# Patient Record
Sex: FEMALE | Race: BLACK OR AFRICAN AMERICAN | NOT HISPANIC OR LATINO | Employment: UNEMPLOYED | URBAN - METROPOLITAN AREA
[De-identification: names, ages, dates, MRNs, and addresses within clinical notes are randomized per-mention and may not be internally consistent; named-entity substitution may affect disease eponyms.]

---

## 2017-01-06 ENCOUNTER — ALLIED HEALTH/NURSE VISIT (OUTPATIENT)
Dept: NURSING | Facility: CLINIC | Age: 42
End: 2017-01-06
Payer: COMMERCIAL

## 2017-01-06 PROCEDURE — 96372 THER/PROPH/DIAG INJ SC/IM: CPT

## 2017-01-06 PROCEDURE — 99207 ZZC NO CHARGE NURSE ONLY: CPT

## 2017-01-06 NOTE — PROGRESS NOTES
MEDICATION: Depo Provera 150mg  ROUTE: IM  SITE: Deltoid - Right  DOSE: 1 mL  LOT #: [id number]  :  SNAPCARD   EXPIRATION DATE:  03/2019  NDC#: [address]-2    Martina Archuleta CMA  Follow Up Injection    Patient returning during stated date range given at previous visit: Yes      If here at the correct interval:   BP Readings from Last 1 Encounters:   12/22/16 168/110     Wt Readings from Last 1 Encounters:   12/22/16 112 lb 11.2 oz (51.12 kg)         Side effects or problems with last injection?  No.  Date range is given to patient for next dose: 03/24-04/06/2017    See Medication Note for administration information    Staff Sig: Martina Archuleta CMA

## 2017-01-19 DIAGNOSIS — E87.6 HYPOKALEMIA: ICD-10-CM

## 2017-01-19 DIAGNOSIS — L50.9 HIVES: ICD-10-CM

## 2017-01-19 DIAGNOSIS — I10 BENIGN ESSENTIAL HYPERTENSION: Primary | ICD-10-CM

## 2017-01-19 NOTE — TELEPHONE ENCOUNTER
Reason for Call:  Medication refill:    Do you use a [facility]?  Name of the pharmacy and phone number for the current request: [facility] [address]    Name of the medication requested: metoprolol (LOPRESSOR) 50 MG tablet, loratadine (CLARITIN) 10 MG tablet, & amLODIPine (NORVASC) 10 MG tablet    Other request: Patient is requesting a 3 month supply of all 3 Rx's & she is completely out of the amLODIPine (NORVASC) 10 MG tablet    Can we leave a detailed message on this number? YES    Phone number patient can be reached at: Cell number on file:    Telephone Information:   Mobile [phone]       Best Time: Anytime    Call taken on 1/19/2017 at 1:05 PM by Nik Vallejo

## 2017-01-20 RX ORDER — AMLODIPINE BESYLATE 10 MG/1
10 TABLET ORAL DAILY
Qty: 90 TABLET | Refills: 0 | Status: SHIPPED | OUTPATIENT
Start: 2017-01-20 | End: 2017-02-06

## 2017-01-20 RX ORDER — METOPROLOL TARTRATE 50 MG
50 TABLET ORAL 2 TIMES DAILY
Qty: 180 TABLET | Refills: 0 | Status: SHIPPED | OUTPATIENT
Start: 2017-01-20 | End: 2017-02-06

## 2017-01-20 RX ORDER — LORATADINE 10 MG/1
10 TABLET ORAL DAILY
Qty: 90 TABLET | Refills: 2 | Status: SHIPPED | OUTPATIENT
Start: 2017-01-20 | End: 2017-02-06

## 2017-01-20 NOTE — TELEPHONE ENCOUNTER
amLODIPine (NORVASC) 10 MG tablet & metoprolol  Last Written Prescription Date: 09/06/2015  Last Fill Quantity: 90 & 180, # refills: 3  Last Office Visit with [facility], [facility] or [facility] prescribing provider: 09/28/2016       POTASSIUM   Date Value Ref Range Status   09/28/2016 3.0* 3.4 - 5.3 mmol/L Final     CREATININE   Date Value Ref Range Status   09/28/2016 1.05* 0.52 - 1.04 mg/dL Final     BP Readings from Last 3 Encounters:   12/22/16 168/110   10/27/16 120/88   09/28/16 152/86     Routing refill request to provider for review/approval because:  Blood pressure above goal. Asking for 90 days supply.      loratadine (CLARITIN) 10 MG tablet  Last Written Prescription Date: 11/10/2016  Last Fill Quantity: 90,  # refills: 3   Last Office Visit with [facility], [facility] or [facility] prescribing provider: 09/28/2016     Prescription approved per [facility] Refill Protocol.

## 2017-01-20 NOTE — TELEPHONE ENCOUNTER
1. Due for an ov  Re the BP in March so given #90 of both     2. Please get the K+ done asap as was low last time    Apparently on K+ supplements still?

## 2017-01-31 DIAGNOSIS — E87.6 HYPOKALEMIA: ICD-10-CM

## 2017-01-31 DIAGNOSIS — R73.9 HYPERGLYCEMIA: ICD-10-CM

## 2017-01-31 LAB
ANION GAP SERPL CALCULATED.3IONS-SCNC: 7 MMOL/L (ref 3–14)
BUN SERPL-MCNC: 10 MG/DL (ref 7–30)
CALCIUM SERPL-MCNC: 9.1 MG/DL (ref 8.5–10.1)
CHLORIDE SERPL-SCNC: 108 MMOL/L (ref 94–109)
CO2 SERPL-SCNC: 25 MMOL/L (ref 20–32)
CREAT SERPL-MCNC: 0.84 MG/DL (ref 0.52–1.04)
GFR SERPL CREATININE-BSD FRML MDRD: 74 ML/MIN/1.7M2
GLUCOSE SERPL-MCNC: 98 MG/DL (ref 70–99)
HBA1C MFR BLD: 5.3 % (ref 4.3–6)
POTASSIUM SERPL-SCNC: 3.9 MMOL/L (ref 3.4–5.3)
SODIUM SERPL-SCNC: 140 MMOL/L (ref 133–144)

## 2017-01-31 PROCEDURE — 36415 COLL VENOUS BLD VENIPUNCTURE: CPT | Performed by: FAMILY MEDICINE

## 2017-01-31 PROCEDURE — 83036 HEMOGLOBIN GLYCOSYLATED A1C: CPT | Performed by: FAMILY MEDICINE

## 2017-01-31 PROCEDURE — 80048 BASIC METABOLIC PNL TOTAL CA: CPT | Performed by: FAMILY MEDICINE

## 2017-01-31 NOTE — LETTER
[facility]  [address]  [address]  [place] [facility] [place] [address]  [phone]                                                                                                           Celine Judd  [address]     [place] [address]    May 30, 2017      Dear Celine,    The results of your recent tests were reviewed and are enclosed.     Last hemoglobin a1c was very good. Please get another one in next month or so.    Results for orders placed or performed in visit on 01/31/17   Basic metabolic panel   Result Value Ref Range    Sodium 140 133 - 144 mmol/L    Potassium 3.9 3.4 - 5.3 mmol/L    Chloride 108 94 - 109 mmol/L    Carbon Dioxide 25 20 - 32 mmol/L    Anion Gap 7 3 - 14 mmol/L    Glucose 98 70 - 99 mg/dL    Urea Nitrogen 10 7 - 30 mg/dL    Creatinine 0.84 0.52 - 1.04 mg/dL    GFR Estimate 74 >60 mL/min/1.7m2    GFR Estimate If Black 89 >60 mL/min/1.7m2    Calcium 9.1 8.5 - 10.1 mg/dL   Hemoglobin A1c   Result Value Ref Range    Hemoglobin A1C 5.3 4.3 - 6.0 %     Thank you for choosing [facility].  We appreciate the opportunity to serve you and look forward to supporting your healthcare needs in the future.    If you have any questions or concerns, please call me or my staff at [phone].      Sincerely,    Willian Green MD

## 2017-02-06 ENCOUNTER — TELEPHONE (OUTPATIENT)
Dept: FAMILY MEDICINE | Facility: CLINIC | Age: 42
End: 2017-02-06

## 2017-02-06 DIAGNOSIS — I10 BENIGN ESSENTIAL HYPERTENSION: Primary | ICD-10-CM

## 2017-02-06 DIAGNOSIS — E87.6 HYPOKALEMIA: ICD-10-CM

## 2017-02-06 RX ORDER — METOPROLOL TARTRATE 50 MG
50 TABLET ORAL 2 TIMES DAILY
Qty: 180 TABLET | Refills: 0 | Status: SHIPPED | OUTPATIENT
Start: 2017-02-06 | End: 2017-11-09 | Stop reason: DRUGHIGH

## 2017-02-06 RX ORDER — AMLODIPINE BESYLATE 10 MG/1
10 TABLET ORAL DAILY
Qty: 90 TABLET | Refills: 0 | Status: SHIPPED | OUTPATIENT
Start: 2017-02-06 | End: 2018-09-06

## 2017-02-06 RX ORDER — LORATADINE 10 MG/1
10 TABLET ORAL DAILY
Qty: 90 TABLET | Refills: 2 | Status: SHIPPED | OUTPATIENT
Start: 2017-02-06 | End: 2017-06-23

## 2017-02-06 RX ORDER — AMLODIPINE BESYLATE 10 MG/1
10 TABLET ORAL DAILY
Qty: 90 TABLET | Refills: 0 | OUTPATIENT
Start: 2017-02-06

## 2017-02-06 RX ORDER — LORATADINE 10 MG/1
10 TABLET ORAL DAILY
Qty: 90 TABLET | Refills: 3 | OUTPATIENT
Start: 2017-02-06

## 2017-02-06 RX ORDER — METOPROLOL TARTRATE 50 MG
50 TABLET ORAL 2 TIMES DAILY
Qty: 180 TABLET | Refills: 0 | OUTPATIENT
Start: 2017-02-06

## 2017-02-06 NOTE — TELEPHONE ENCOUNTER
Patient calling to note that the Rx's below were supposed to be sent to [facility] [place], [place] - [address] [place] [address]  Please cancel Rx's at the [id number] - [place], [place] - [address] & send to the correct preferred pharmacy

## 2017-02-06 NOTE — TELEPHONE ENCOUNTER
Patient had Rx's approved by provider for Norvasc,amlodipine and Claritin. She will like Rx's sent to [facility]. Jim approved Rx's to [facility]. pt was informed.

## 2017-02-15 ENCOUNTER — RADIANT APPOINTMENT (OUTPATIENT)
Dept: MAMMOGRAPHY | Facility: CLINIC | Age: 42
End: 2017-02-15
Payer: COMMERCIAL

## 2017-02-15 DIAGNOSIS — Z12.31 VISIT FOR SCREENING MAMMOGRAM: ICD-10-CM

## 2017-02-15 PROCEDURE — G0202 SCR MAMMO BI INCL CAD: HCPCS | Mod: TC

## 2017-03-28 ENCOUNTER — ALLIED HEALTH/NURSE VISIT (OUTPATIENT)
Dept: NURSING | Facility: CLINIC | Age: 42
End: 2017-03-28
Payer: COMMERCIAL

## 2017-03-28 PROCEDURE — 96372 THER/PROPH/DIAG INJ SC/IM: CPT

## 2017-03-28 PROCEDURE — 99207 ZZC NO CHARGE NURSE ONLY: CPT

## 2017-03-28 RX ORDER — MEDROXYPROGESTERONE ACETATE 150 MG/ML
150 INJECTION, SUSPENSION INTRAMUSCULAR
Qty: 1 ML | Refills: 0 | OUTPATIENT
Start: 2017-03-28 | End: 2022-08-01

## 2017-03-28 NOTE — MR AVS SNAPSHOT
"              After Visit Summary   3/28/2017    Celine Judd    MRN: [id number]           Patient Information     Date Of Birth          1975        Visit Information        Provider Department      3/28/2017 2:30 PM BX NURSE [facility]        Today's Diagnoses     Contraception    -  1       Follow-ups after your visit        Who to contact     If you have questions or need follow up information about today's clinic visit or your schedule please contact [facility] directly at [phone].  Normal or non-critical lab and imaging results will be communicated to you by [employer], letter or phone within 4 business days after the clinic has received the results. If you do not hear from us within 7 days, please contact the clinic through [employer] or phone. If you have a critical or abnormal lab result, we will notify you by phone as soon as possible.  Submit refill requests through [employer] or call your pharmacy and they will forward the refill request to us. Please allow 3 business days for your refill to be completed.          Additional Information About Your Visit        MyChart Information     [employer] lets you send messages to your doctor, view your test results, renew your prescriptions, schedule appointments and more. To sign up, go to [email or web address] . Click on \"Log in\" on the left side of the screen, which will take you to the Welcome page. Then click on \"Sign up Now\" on the right side of the page.     You will be asked to enter the access code listed below, as well as some personal information. Please follow the directions to create your username and password.     Your access code is: [email or web address]  Expires: 2017  4:19 PM     Your access code will  in 90 days. If you need help or a new code, please call your [facility] or [phone].        Care EveryWhere ID     This is your Care EveryWhere ID. This could be used by " other organizations to access your [facility] medical records  [id number]         Blood Pressure from Last 3 Encounters:   12/22/16 (!) 168/110   10/27/16 120/88   09/28/16 152/86    Weight from Last 3 Encounters:   12/22/16 112 lb 11.2 oz (51.1 kg)   10/27/16 107 lb 12.8 oz (48.9 kg)   09/28/16 109 lb (49.4 kg)              Today, you had the following     No orders found for display         Today's Medication Changes          These changes are accurate as of: 3/28/17  4:19 PM.  If you have any questions, ask your nurse or doctor.               These medicines have changed or have updated prescriptions.        Dose/Directions    * medroxyPROGESTERone 150 MG/ML injection   Commonly known as:  DEPO-PROVERA   This may have changed:  Another medication with the same name was added. Make sure you understand how and when to take each.   Used for:  Contraception        Dose:  150 mg   Inject 1 mL (150 mg) into the muscle every 3 months   Quantity:  0.9 mL   Refills:  3       * medroxyPROGESTERone 150 MG/ML injection   Commonly known as:  DEPO-PROVERA   This may have changed:  You were already taking a medication with the same name, and this prescription was added. Make sure you understand how and when to take each.   Used for:  Contraception        Dose:  150 mg   Inject 1 mL (150 mg) into the muscle every 3 months   Quantity:  1 mL   Refills:  0       * Notice:  This list has 2 medication(s) that are the same as other medications prescribed for you. Read the directions carefully, and ask your doctor or other care provider to review them with you.         Where to get your medicines      Some of these will need a paper prescription and others can be bought over the counter.  Ask your nurse if you have questions.     You don't need a prescription for these medications     medroxyPROGESTERone 150 MG/ML injection                Primary Care Provider Office Phone # Fax #    Latonia Nguyen [phone]  [phone]       [facility] [address]  [facility] [place] [facility] [address]        Thank you!     Thank you for choosing [facility]  for your care. Our goal is always to provide you with excellent care. Hearing back from our patients is one way we can continue to improve our services. Please take a few minutes to complete the written survey that you may receive in the mail after your visit with us. Thank you!             Your Updated Medication List - Protect others around you: Learn how to safely use, store and throw away your medicines at www.disposemymeds.org.          This list is accurate as of: 3/28/17  4:19 PM.  Always use your most recent med list.                   Brand Name Dispense Instructions for use    acetaminophen 500 MG tablet    TYLENOL    100 tablet    Take 2 tablets po q 6 hr prn pain       amLODIPine 10 MG tablet    NORVASC    90 tablet    Take 1 tablet (10 mg) by mouth daily       BENADRYL PO          cetirizine 5 MG Chew    zyrTEC     Take 5 mg by mouth daily       cholecalciferol 5000 UNITS Caps capsule    vitamin D3    90 capsule    Take 1 capsule (5,000 Units) by mouth daily       escitalopram 10 MG tablet    LEXAPRO    30 tablet    Take 1 tablet (10 mg) by mouth daily       loratadine 10 MG tablet    CLARITIN    90 tablet    Take 1 tablet (10 mg) by mouth daily       * medroxyPROGESTERone 150 MG/ML injection    DEPO-PROVERA    0.9 mL    Inject 1 mL (150 mg) into the muscle every 3 months       * medroxyPROGESTERone 150 MG/ML injection    DEPO-PROVERA    1 mL    Inject 1 mL (150 mg) into the muscle every 3 months       metoprolol 50 MG tablet    LOPRESSOR    180 tablet    Take 1 tablet (50 mg) by mouth 2 times daily       naproxen 500 MG tablet    NAPROSYN    60 tablet    Take 1 tablet (500 mg) by mouth 2 times daily (with meals)       potassium chloride SA 10 MEQ CR tablet    K-DUR/KLOR-CON M    20 tablet    Take 1 tablet (10 mEq) by mouth 2 times  daily       ranitidine 150 MG tablet    ZANTAC    180 tablet    Take 1 tablet (150 mg) by mouth 2 times daily       * Notice:  This list has 2 medication(s) that are the same as other medications prescribed for you. Read the directions carefully, and ask your doctor or other care provider to review them with you.

## 2017-03-28 NOTE — NURSING NOTE
Prior to injection verified patient identity using patient's name and date of birth.  Karla Brooks LPN

## 2017-05-29 NOTE — PROGRESS NOTES
Last hemoglobin a1c was very good. Please get another one in next month or so.     Please let patient know by calling or sending a letter.

## 2017-06-23 ENCOUNTER — OFFICE VISIT (OUTPATIENT)
Dept: FAMILY MEDICINE | Facility: CLINIC | Age: 42
End: 2017-06-23
Payer: COMMERCIAL

## 2017-06-23 VITALS
WEIGHT: 114 LBS | HEIGHT: 65 IN | DIASTOLIC BLOOD PRESSURE: 68 MMHG | SYSTOLIC BLOOD PRESSURE: 124 MMHG | HEART RATE: 95 BPM | TEMPERATURE: 97.8 F | RESPIRATION RATE: 20 BRPM | OXYGEN SATURATION: 98 % | BODY MASS INDEX: 18.99 KG/M2

## 2017-06-23 DIAGNOSIS — F32.5 MAJOR DEPRESSION IN COMPLETE REMISSION (H): ICD-10-CM

## 2017-06-23 DIAGNOSIS — Z30.8 ENCOUNTER FOR OTHER CONTRACEPTIVE MANAGEMENT: ICD-10-CM

## 2017-06-23 DIAGNOSIS — E87.6 HYPOKALEMIA: ICD-10-CM

## 2017-06-23 DIAGNOSIS — K21.9 GASTROESOPHAGEAL REFLUX DISEASE WITHOUT ESOPHAGITIS: ICD-10-CM

## 2017-06-23 DIAGNOSIS — J30.9 ALLERGIC RHINITIS, UNSPECIFIED ALLERGIC RHINITIS TRIGGER, UNSPECIFIED RHINITIS SEASONALITY: ICD-10-CM

## 2017-06-23 DIAGNOSIS — I10 BENIGN ESSENTIAL HYPERTENSION: Primary | ICD-10-CM

## 2017-06-23 DIAGNOSIS — E46 PROTEIN-CALORIE MALNUTRITION (H): ICD-10-CM

## 2017-06-23 PROCEDURE — 96372 THER/PROPH/DIAG INJ SC/IM: CPT | Performed by: FAMILY MEDICINE

## 2017-06-23 PROCEDURE — 99213 OFFICE O/P EST LOW 20 MIN: CPT | Mod: 25 | Performed by: FAMILY MEDICINE

## 2017-06-23 RX ORDER — LORATADINE 10 MG/1
10 TABLET ORAL DAILY
Qty: 90 TABLET | Refills: 3 | Status: SHIPPED | OUTPATIENT
Start: 2017-06-23 | End: 2018-03-16

## 2017-06-23 RX ORDER — METOPROLOL TARTRATE 50 MG
50 TABLET ORAL 2 TIMES DAILY
Qty: 180 TABLET | Refills: 0 | Status: CANCELLED | OUTPATIENT
Start: 2017-06-23

## 2017-06-23 RX ORDER — AMLODIPINE BESYLATE 10 MG/1
10 TABLET ORAL DAILY
Qty: 90 TABLET | Refills: 0 | Status: CANCELLED | OUTPATIENT
Start: 2017-06-23

## 2017-06-23 NOTE — PATIENT INSTRUCTIONS
1. Your blood pressure has been high or you are starting a new medication for it :   Please take 2-4 blood pressures and heart rates a week and write them down with date, time of day and location and bring this record in in 3-5 weeks. The optimal blood  pressure is < 140/80 or close to this most of the time.  Give some to me in 3 weeks

## 2017-06-23 NOTE — NURSING NOTE
"Chief Complaint   Patient presents with     Recheck Medication     /68  Pulse 95  Temp 97.8  F (36.6  C) (Tympanic)  Resp 20  Ht 5' 5\" (1.651 m)  Wt 114 lb (51.7 kg)  SpO2 98%  BMI 18.97 kg/m2 Estimated body mass index is 18.97 kg/(m^2) as calculated from the following:    Height as of this encounter: 5' 5\" (1.651 m).    Weight as of this encounter: 114 lb (51.7 kg).  BP completed using cuff size: leonid Archuleta CMA    Health Maintenance Due   Topic Date Due     EYE EXAM Q1 YEAR  05/14/2016     PHQ-9 Q6 MONTHS  01/13/2017     DEPRESSION ACTION PLAN Q1 YR  06/14/2017     PAP Q6 MOS DIAGNOSTIC  06/22/2017     Health Maintenance reviewed at today's visit patient asked to schedule/complete:   Cervical Cancer:  Patient agrees to schedule  Depression:  Patient agrees to schedule  Diabetes:  Patient agrees to schedule    "

## 2017-06-23 NOTE — MR AVS SNAPSHOT
After Visit Summary   6/23/2017    Celine Judd    MRN: [id number]           Patient Information     Date Of Birth          1975        Visit Information        Provider Department      6/23/2017 2:20 PM Latonia Nguyen MD [facility]        Today's Diagnoses     Protein-calorie malnutrition (H)    -  1    Benign essential hypertension        Hypokalemia        Allergic rhinitis, unspecified allergic rhinitis trigger, unspecified rhinitis seasonality        Gastroesophageal reflux disease without esophagitis          Care Instructions    1. Your blood pressure has been high or you are starting a new medication for it :   Please take 2-4 blood pressures and heart rates a week and write them down with date, time of day and location and bring this record in in 3-5 weeks. The optimal blood  pressure is < 140/80 or close to this most of the time.  Give some to me in 3 weeks           Follow-ups after your visit        Who to contact     If you have questions or need follow up information about today's clinic visit or your schedule please contact [facility] directly at [phone].  Normal or non-critical lab and imaging results will be communicated to you by [employer], letter or phone within 4 business days after the clinic has received the results. If you do not hear from us within 7 days, please contact the clinic through [employer] or phone. If you have a critical or abnormal lab result, we will notify you by phone as soon as possible.  Submit refill requests through [employer] or call your pharmacy and they will forward the refill request to us. Please allow 3 business days for your refill to be completed.          Additional Information About Your Visit        MyChart Information     [employer] lets you send messages to your doctor, view your test results, renew your prescriptions, schedule appointments and more. To sign up, go to  [email or web address] . Click on \"Log in\" on the left side of the screen, which will take you to the Welcome page. Then click on \"Sign up Now\" on the right side of the page.     You will be asked to enter the access code listed below, as well as some personal information. Please follow the directions to create your username and password.     Your access code is: [email or web address]  Expires: 2017  4:19 PM     Your access code will  in 90 days. If you need help or a new code, please call your [facility] clinic or [phone].        Care EveryWhere ID     This is your Care EveryWhere ID. This could be used by other organizations to access your [facility] medical records  [id number]        Your Vitals Were     Pulse Temperature Respirations Height Pulse Oximetry BMI (Body Mass Index)    95 97.8  F (36.6  C) (Tympanic) 20 5' 5\" (1.651 m) 98% 18.97 kg/m2       Blood Pressure from Last 3 Encounters:   17 124/68   16 (!) 168/110   10/27/16 120/88    Weight from Last 3 Encounters:   17 114 lb (51.7 kg)   16 112 lb 11.2 oz (51.1 kg)   10/27/16 107 lb 12.8 oz (48.9 kg)              We Performed the Following     DEPRESSION ACTION PLAN (DAP)          Today's Medication Changes          These changes are accurate as of: 17  2:28 PM.  If you have any questions, ask your nurse or doctor.               Stop taking these medicines if you haven't already. Please contact your care team if you have questions.     cetirizine 5 MG Chew   Commonly known as:  zyrTEC   Stopped by:  Latonia Nguyen MD                Where to get your medicines      These medications were sent to [facility] [address] [address]     Phone:  [phone]     loratadine 10 MG tablet    ranitidine 150 MG tablet                Primary Care Provider Office Phone # Fax #    Latonia Nguyen [phone]       [facility] " [facility] [address]  [facility] [address]        Equal Access to Services     SEA GOLD : Hadii aad ku hadgrego Sogloriaali, waaxda luqadaha, qaybta kaalmada adeegyada, luis caren oliverkaro taylornatalie heard ortiz gloria. So [facility] [phone].    ATENCIÓN: Si habla español, tiene a iglesias disposición servicios gratuitos de asistencia lingüística. Llame al [phone].    We comply with applicable federal civil rights laws and [place] laws. We do not discriminate on the basis of race, color, national origin, age, disability sex, sexual orientation or gender identity.            Thank you!     Thank you for choosing [facility]  for your care. Our goal is always to provide you with excellent care. Hearing back from our patients is one way we can continue to improve our services. Please take a few minutes to complete the written survey that you may receive in the mail after your visit with us. Thank you!             Your Updated Medication List - Protect others around you: Learn how to safely use, store and throw away your medicines at www.disposemymeds.org.          This list is accurate as of: 6/23/17  2:28 PM.  Always use your most recent med list.                   Brand Name Dispense Instructions for use Diagnosis    acetaminophen 500 MG tablet    TYLENOL    100 tablet    Take 2 tablets po q 6 hr prn pain    Acute pain of left knee       amLODIPine 10 MG tablet    NORVASC    90 tablet    Take 1 tablet (10 mg) by mouth daily    Benign essential hypertension, Hypokalemia       BENADRYL PO           cholecalciferol 5000 UNITS Caps capsule    vitamin D3    90 capsule    Take 1 capsule (5,000 Units) by mouth daily    Vitamin D deficiency disease       loratadine 10 MG tablet    CLARITIN    90 tablet    Take 1 tablet (10 mg) by mouth daily    Benign essential hypertension, Hypokalemia       medroxyPROGESTERone 150 MG/ML injection    DEPO-PROVERA    1 mL    Inject 1 mL (150 mg) into  the muscle every 3 months    Contraception       metoprolol 50 MG tablet    LOPRESSOR    180 tablet    Take 1 tablet (50 mg) by mouth 2 times daily    Benign essential hypertension, Hypokalemia       naproxen 500 MG tablet    NAPROSYN    60 tablet    Take 1 tablet (500 mg) by mouth 2 times daily (with meals)    Pain in joint involving ankle and foot, left       potassium chloride SA 10 MEQ CR tablet    K-DUR/KLOR-CON M    20 tablet    Take 1 tablet (10 mEq) by mouth 2 times daily    Hypokalemia       ranitidine 150 MG tablet    ZANTAC    180 tablet    Take 1 tablet (150 mg) by mouth 2 times daily    Gastroesophageal reflux disease without esophagitis

## 2017-06-23 NOTE — PROGRESS NOTES
SUBJECTIVE:                                                    Celine Judd is a 41 year old female who presents to clinic today for the following health issues:        Hypertension Follow-up      Outpatient blood pressures are not being checked.   Here < 140/80 today but were hi to rankin 86to 115 till today     Low Salt Diet: low salt    on meds       Amount of exercise or physical activity: None    Problems taking medications regularly: No    Medication side effects: none  Diet: regular (no restrictions)      CONTRACEPTION MANAGEMENT     -on depo provera   -due for shot today   -no problems on \it    ALLERGIES      Duration: since child     Description:   Nasal congestion: YES  Sneezing: YES  Red, itchy eyes: no     Accompanying signs and symptoms: --    History (similar episodes/allergy testing): None    Precipitating or alleviating factors: None    Therapies tried and outcome: claritin    GERD/Heartburn      Duration: many yrs     Description (location/character/radiation): gerd     Intensity:  mild    Accompanying signs and symptoms:  food getting stuck: no   nausea/vomiting/blood: no   abdominal pain: no   black/tarry or bloody stools: no :    History (similar episodes/previous evaluation): None    Precipitating or alleviating factors:  worse with spicy foods and caffeinated drinks.  current NSAID/Aspirin use: no     Therapies tried and outcome: Zantac (Ranitidine)       PROTEIN CALORIE MALNUTRITION    -always underwt entir life   -worse as was hiking and mom has breast ca   -stable      Depression Followup    Status since last visit: Stable i remission on meds     See PHQ-9 for current symptoms.  Other associated symptoms: None    Complicating factors:   Significant life event:  Yes-  momx breast ca   Current substance abuse:  None  Anxiety or Panic symptoms:  No    PHQ-9  English  PHQ-9   Any Language    Problem list and histories reviewed & adjusted, as indicated.  Additional history: as  "documented    Labs reviewed in EPIC    Reviewed and updated as needed this visit by clinical staff  Allergies  Meds  Problems       Reviewed and updated as needed this visit by Provider  Allergies  Meds  Problems         ROS:  C: NEGATIVE for fever, chills, change in weight  I: NEGATIVE for worrisome rashes, moles or lesions  E: NEGATIVE for vision changes or irritation  E/M: NEGATIVE for ear, mouth and throat problems  R: NEGATIVE for significant cough or SOB  B: NEGATIVE for masses, tenderness or discharge  CV: NEGATIVE for chest pain, palpitations or peripheral edema  GI: NEGATIVE for nausea, abdominal pain, heartburn, or change in bowel habits  : NEGATIVE for frequency, dysuria, or hematuria  M: NEGATIVE for significant arthralgias or myalgia  N: NEGATIVE for weakness, dizziness or paresthesias  E: NEGATIVE for temperature intolerance, skin/hair changes  H: NEGATIVE for bleeding problems  P: NEGATIVE for changes in mood or affect    OBJECTIVE:     /68  Pulse 95  Temp 97.8  F (36.6  C) (Tympanic)  Resp 20  Ht 5' 5\" (1.651 m)  Wt 114 lb (51.7 kg)  SpO2 98%  BMI 18.97 kg/m2  Body mass index is 18.97 kg/(m^2).  GENERAL: healthy, alert and no distress; under wt  EYES: Eyes grossly normal to inspection, PERRL and conjunctivae and sclerae normal  RESP: lungs clear to auscultation - no rales, rhonchi or wheezes  CV: regular rate and rhythm, normal S1 S2, no S3 or S4, no murmur, click or rub, no peripheral edema and peripheral pulses strong  ABDOMEN: soft, nontender, no hepatosplenomegaly, no masses and bowel sounds normal  MS: no gross musculoskeletal defects noted, no edema  SKIN: no suspicious lesions or rashes  NEURO: Normal strength and tone, mentation intact and speech normal  PSYCH: mentation appears normal, affect normal/bright, anxious and appearance well groomed    Diagnostic Test Results:  none     ASSESSMENT/PLAN:               ICD-10-CM    1. Benign essential hypertension-poor control " till today I10 loratadine (CLARITIN) 10 MG tablet   2. Major depression in complete remission (H) on meds since 4-24-15 F32.5    3. Hypokalemia E87.6 loratadine (CLARITIN) 10 MG tablet   4. Protein-calorie malnutrition (H) E46    5. Allergic rhinitis, unspecified allergic rhinitis trigger, unspecified rhinitis seasonality J30.9    6. Gastroesophageal reflux disease without esophagitis K21.9 ranitidine (ZANTAC) 150 MG tablet   7. Encounter for other contraceptive management-depoprovera Z30.8        Patient Instructions   1. Your blood pressure has been high or you are starting a new medication for it :   Please take 2-4 blood pressures and heart rates a week and write them down with date, time of day and location and bring this record in in 3-5 weeks. The optimal blood  pressure is < 140/80 or close to this most of the time.  Give some to me in 3 weeks       Latonia Nguyen MD  [facility]    Initial Depo Injection     Is the patient within 1st 5 days of a normal period?   No  Is the patient post delivery ?      No  If yes, is she breastfeeding?  No  If yes breastfeeding, shot given within 6 weeks after delivery?    No.  If no breastfeeding, shot given within 5 days of delivery?  N/A    BP Readings from Last 1 Encounters:   06/23/17 124/68     No LMP recorded. Patient has had an injection.    Date range to return is given to patient for her next dose: 09/08/17-09/22/17     See Medication Note for administration information    Staff Sig: Martina Archuleta CMA    Follow Up Injection    Patient returning during stated date range given at previous visit: Yes      If here at the correct interval:   BP Readings from Last 1 Encounters:   06/23/17 124/68     Wt Readings from Last 1 Encounters:   06/23/17 114 lb (51.7 kg)       Last Pap/exam date: 2015      Side effects or problems with last injection?  No.  Date range is given to patient for next dose: per card    See Medication Note for  administration information    Staff Sig: Latonia Nguyen MD

## 2017-06-24 PROBLEM — R73.01 IMPAIRED FASTING GLUCOSE: Status: ACTIVE | Noted: 2017-06-24

## 2017-06-24 ASSESSMENT — PATIENT HEALTH QUESTIONNAIRE - PHQ9: SUM OF ALL RESPONSES TO PHQ QUESTIONS 1-9: 0

## 2017-09-11 ENCOUNTER — OFFICE VISIT (OUTPATIENT)
Dept: NURSING | Facility: CLINIC | Age: 42
End: 2017-09-11
Payer: COMMERCIAL

## 2017-09-11 PROCEDURE — 96372 THER/PROPH/DIAG INJ SC/IM: CPT

## 2017-09-11 PROCEDURE — 99207 ZZC NO CHARGE NURSE ONLY: CPT

## 2017-09-11 NOTE — PROGRESS NOTES
Follow Up Injection    Patient returning during stated date range given at previous visit: Yes      If here at the correct interval:   BP Readings from Last 1 Encounters:   06/23/17 124/68     Wt Readings from Last 1 Encounters:   06/23/17 114 lb (51.7 kg)       Last Pap/exam date: 12/22/16      Side effects or problems with last injection?  No.  Date range is given to patient for next dose: November 27-December 11    See Medication Note for administration information    Staff Sig: Jessica Coello CMA

## 2017-09-11 NOTE — MR AVS SNAPSHOT
"              After Visit Summary   9/11/2017    Celine Judd    MRN: [id number]           Patient Information     Date Of Birth          1975        Visit Information        Provider Department      9/11/2017 10:30 AM BX NURSE [facility]        Today's Diagnoses     Contraception    -  1       Follow-ups after your visit        Your next 10 appointments already scheduled     Sep 11, 2017 10:30 AM CDT   SHORT with BX NURSE   [facility] ([facility])    [address] [place] [id number] [phone]              Who to contact     If you have questions or need follow up information about today's clinic visit or your schedule please contact [facility] directly at [phone].  Normal or non-critical lab and imaging results will be communicated to you by LegalZoomhart, letter or phone within 4 business days after the clinic has received the results. If you do not hear from us within 7 days, please contact the clinic through LegalZoomhart or phone. If you have a critical or abnormal lab result, we will notify you by phone as soon as possible.  Submit refill requests through TL[employer] or call your pharmacy and they will forward the refill request to us. Please allow 3 business days for your refill to be completed.          Additional Information About Your Visit        MyChart Information     TLabs lets you send messages to your doctor, view your test results, renew your prescriptions, schedule appointments and more. To sign up, go to [email or web address] . Click on \"Log in\" on the left side of the screen, which will take you to the Welcome page. Then click on \"Sign up Now\" on the right side of the page.     You will be asked to enter the access code listed below, as well as some personal information. Please follow the directions to create your username and password.   "   Your access code is: [email or web address]  Expires: 12/10/2017  9:41 AM     Your access code will  in 90 days. If you need help or a new code, please call your [facility] clinic or [phone].        Care EveryWhere ID     This is your Care EveryWhere ID. This could be used by other organizations to access your [facility] medical records  [id number]         Blood Pressure from Last 3 Encounters:   17 124/68   16 (!) 168/110   10/27/16 120/88    Weight from Last 3 Encounters:   17 114 lb (51.7 kg)   16 112 lb 11.2 oz (51.1 kg)   10/27/16 107 lb 12.8 oz (48.9 kg)              We Performed the Following     Medroxyprogesterone inj/1mg (Depo Provera J-Code)     THER/PROPH/DIAG INJ, SC/IM        Primary Care Provider Office Phone # Fax #    Latonia Divya Nguyen [phone]       [address] [facility] [id number]        Equal Access to Services     [facility]: Hadii aad ku hadasho Soomaali, waaxda luqadaha, qaybta kaalmada adeegyada, luis alcantar . So [facility] [phone].    ATENCIÓN: Si habla español, tiene a iglesias disposición servicios gratuitos de asistencia lingüística. Llame al [phone].    We comply with applicable federal civil rights laws and [place] laws. We do not discriminate on the basis of race, color, national origin, age, disability sex, sexual orientation or gender identity.            Thank you!     Thank you for choosing [facility]  for your care. Our goal is always to provide you with excellent care. Hearing back from our patients is one way we can continue to improve our services. Please take a few minutes to complete the written survey that you may receive in the mail after your visit with us. Thank you!             Your Updated Medication List - Protect others around you: Learn how to safely use, store and throw away your medicines at www.disposemymeds.org.          This list is accurate  as of: 9/11/17  9:41 AM.  Always use your most recent med list.                   Brand Name Dispense Instructions for use Diagnosis    acetaminophen 500 MG tablet    TYLENOL    100 tablet    Take 2 tablets po q 6 hr prn pain    Acute pain of left knee       amLODIPine 10 MG tablet    NORVASC    90 tablet    Take 1 tablet (10 mg) by mouth daily    Benign essential hypertension, Hypokalemia       BENADRYL PO           cholecalciferol 5000 UNITS Caps capsule    vitamin D3    90 capsule    Take 1 capsule (5,000 Units) by mouth daily    Vitamin D deficiency disease       loratadine 10 MG tablet    CLARITIN    90 tablet    Take 1 tablet (10 mg) by mouth daily    Benign essential hypertension, Hypokalemia       medroxyPROGESTERone 150 MG/ML injection    DEPO-PROVERA    1 mL    Inject 1 mL (150 mg) into the muscle every 3 months    Contraception       metoprolol 50 MG tablet    LOPRESSOR    180 tablet    Take 1 tablet (50 mg) by mouth 2 times daily    Benign essential hypertension, Hypokalemia       naproxen 500 MG tablet    NAPROSYN    60 tablet    Take 1 tablet (500 mg) by mouth 2 times daily (with meals)    Pain in joint involving ankle and foot, left       potassium chloride SA 10 MEQ CR tablet    K-DUR/KLOR-CON M    20 tablet    Take 1 tablet (10 mEq) by mouth 2 times daily    Hypokalemia       ranitidine 150 MG tablet    ZANTAC    180 tablet    Take 1 tablet (150 mg) by mouth 2 times daily    Gastroesophageal reflux disease without esophagitis

## 2017-11-09 ENCOUNTER — OFFICE VISIT (OUTPATIENT)
Dept: FAMILY MEDICINE | Facility: CLINIC | Age: 42
End: 2017-11-09
Payer: COMMERCIAL

## 2017-11-09 VITALS
TEMPERATURE: 98.2 F | OXYGEN SATURATION: 99 % | HEIGHT: 65 IN | HEART RATE: 87 BPM | WEIGHT: 112 LBS | RESPIRATION RATE: 12 BRPM | DIASTOLIC BLOOD PRESSURE: 110 MMHG | BODY MASS INDEX: 18.66 KG/M2 | SYSTOLIC BLOOD PRESSURE: 160 MMHG

## 2017-11-09 DIAGNOSIS — I10 BENIGN ESSENTIAL HYPERTENSION: Primary | ICD-10-CM

## 2017-11-09 DIAGNOSIS — F33.1 MODERATE EPISODE OF RECURRENT MAJOR DEPRESSIVE DISORDER (H): ICD-10-CM

## 2017-11-09 DIAGNOSIS — R73.01 IMPAIRED FASTING GLUCOSE: ICD-10-CM

## 2017-11-09 DIAGNOSIS — E44.1 MILD PROTEIN-CALORIE MALNUTRITION (H): ICD-10-CM

## 2017-11-09 DIAGNOSIS — F17.200 TOBACCO USE DISORDER: ICD-10-CM

## 2017-11-09 PROCEDURE — 99214 OFFICE O/P EST MOD 30 MIN: CPT | Performed by: FAMILY MEDICINE

## 2017-11-09 RX ORDER — METOPROLOL TARTRATE 100 MG
100 TABLET ORAL 2 TIMES DAILY
Qty: 90 TABLET | Refills: 0 | Status: SHIPPED | OUTPATIENT
Start: 2017-11-09 | End: 2018-03-16

## 2017-11-09 NOTE — PROGRESS NOTES
SUBJECTIVE:   Celine Judd is a 42 year old female who presents to clinic today for the following health issues:    Forms    Hypertension Follow-up      Outpatient blood pressures are not being checked.    Here > 160/100 to 180/110     Med: toprol 50mgm bid, norvasc 10mgm     HR also hi :      Low Salt Diet: no added salt      Amount of exercise or physical activity: None    Problems taking medications regularly: No    Medication side effects: none    Diet: regular (no restrictions)    Glucose Intolerance   Follow-up      Patient is checking blood sugars: not at all     Hi FBS s     Diabetic concerns: None     Symptoms of hypoglycemia (low blood sugar): none     Paresthesias (numbness or burning in feet) or sores: No     Date of last diabetic eye exam: 2016    fam hx DM     Depression and Anxiety Follow-Up    Status since last visit: Worsened with mom's recet Dxes     Other associated symptoms:None    Complicating factors:     Significant life event: No     Current substance abuse: None    Meds: none     PHQ-9 Score and MyChart F/U Questions 6/14/2016 7/13/2016 6/23/2017   Total Score 3 1 0   Q9: Suicide Ideation Not at all Not at all Not at all     No flowsheet data found.    PHQ-9  English  PHQ-9   Any Language  GAD7  Suicide Assessment Five-step Evaluation and Treatment (SAFE-T)    TOBACCO ABUSE    -37 p yr hx till cut down to 1/5 ppd at 38 y/o and now increased with the stress   - rev of risks       Problem list and histories reviewed & adjusted, as indicated.  Additional history: as documented    Labs reviewed in EPIC    Reviewed and updated as needed this visit by clinical staff  Tobacco  Allergies  Meds  Problems  Med Hx  Surg Hx  Fam Hx  Soc Hx        Reviewed and updated as needed this visit by Provider         ROS:  C: NEGATIVE for fever, chills, change in weight  I: NEGATIVE for worrisome rashes, moles or lesions  E: NEGATIVE for vision changes or irritation  E/M: NEGATIVE for ear,  "mouth and throat problems  R: NEGATIVE for significant cough or SOB  B: NEGATIVE for masses, tenderness or discharge  CV: NEGATIVE for chest pain, palpitations or peripheral edema  GI: NEGATIVE for nausea, abdominal pain, heartburn, or change in bowel habits  : NEGATIVE for frequency, dysuria, or hematuria  M: NEGATIVE for significant arthralgias or myalgia  N: NEGATIVE for weakness, dizziness or paresthesias  E: NEGATIVE for temperature intolerance, skin/hair changes  H: NEGATIVE for bleeding problems  P: NEGATIVE for changes in mood or affect  PSYCHIATRIC: POSITIVE foragitation, anxiety, concentration difficulty, depressed mood, Hx anxiety, Hx depression and fatigue    OBJECTIVE:     BP (!) 160/110  Pulse 87  Temp 98.2  F (36.8  C) (Tympanic)  Resp 12  Ht 5' 5\" (1.651 m)  Wt 112 lb (50.8 kg)  LMP  (LMP Unknown)  SpO2 99%  Breastfeeding? No  BMI 18.64 kg/m2  Body mass index is 18.64 kg/(m^2).  GENERAL: healthy, alert, no distress, frail and slim   EYES: Eyes grossly normal to inspection, PERRL and conjunctivae and sclerae normal  RESP: lungs clear to auscultation - no rales, rhonchi or wheezes  CV: regular rate and rhythm, normal S1 S2, no S3 or S4, no murmur, click or rub, no peripheral edema and peripheral pulses strong  MS: no gross musculoskeletal defects noted, no edema  SKIN: no suspicious lesions or rashes  NEURO: Normal strength and tone, mentation intact and speech normal  PSYCH: mentation appears normal, concentration poor, affect normal/bright, tearful, anxious and appearance well groomed    Diagnostic Test Results:  none     ASSESSMENT/PLAN:               ICD-10-CM    1. Benign essential hypertension-uncontrolled- issue of increasing meds  I10 metoprolol (LOPRESSOR) 100 MG tablet   2. Moderate episode of recurrent major depressive disorder (H) w mom's Dx ca F33.1    3. Tobacco use disorder: 25-37y/o (3-13) @ 1/4 ppd=3-4 pk yr hx and continues at 1/5 ppd since  F17.200    4. Mild " protein-calorie malnutrition (H) E44.1    5. Impaired fasting glucose R73.01        Patient Instructions   1. Increase the toprol from 50mgm 2 x a d ay  To 100mgm bid  To lower BP , heart rate and it has a calming effect     2. Your blood pressure has been high or you are starting a new medication for it :   Please take 2-4 blood pressures and heart rates a week and write them down with date, time of day and location and bring this record in in 3-5 weeks. The optimal blood  pressure is < 140/80 or close to this most of the time.    3. QUIT SMOKING !!!!    Tissue heals better if no nicotine around    4. I need to see you by next week  We will start celexa next week if things no better     5.  You need a PAP and physical           Latonia Nguyen MD  [facility]    Time spent with the patient 26mins, more than 50% in counseling and coordinating care, Re above medical problems.  Reviewed her causes of depression , ans thus, the hi BP  With her mom's Dx of thyroid ca and PAD needding surgery  And having just completed chemo fo r her breast ca , all of which have exacerbated her depression again     Hopefully the toprol will lower her HR which is tachycardiac, lower her BP  Which has contd > 160/100 & NOW IS higher , as well as calm her .    She may not need meds if this helps and she sees a clearer picture of her mom's illnesses after further DX etc  As this are both fixable diseasese   Has increased her swmoking  And explained to pt that this will not help, but only make anxiety etc worse   Weight management plan noted, stable and monitoring     Did FMLA with permission of pt with Dx of depression , giving up to 1 d / wk     Latonia Nguyen MD

## 2017-11-09 NOTE — PATIENT INSTRUCTIONS
1. Increase the toprol from 50mgm 2 x a d ay  To 100mgm bid  To lower BP , heart rate and it has a calming effect     2. Your blood pressure has been high or you are starting a new medication for it :   Please take 2-4 blood pressures and heart rates a week and write them down with date, time of day and location and bring this record in in 3-5 weeks. The optimal blood  pressure is < 140/80 or close to this most of the time.    3. QUIT SMOKING !!!!    Tissue heals better if no nicotine around    4. I need to see you by next week  We will start celexa next week if things no better     5.  You need a PAP and physical

## 2017-11-09 NOTE — NURSING NOTE
"Chief Complaint   Patient presents with     Forms     BP (!) 180/100  Pulse 87  Temp 98.2  F (36.8  C) (Tympanic)  Resp 12  Ht 5' 5\" (1.651 m)  Wt 112 lb (50.8 kg)  LMP  (LMP Unknown)  SpO2 99%  Breastfeeding? No  BMI 18.64 kg/m2 Estimated body mass index is 18.64 kg/(m^2) as calculated from the following:    Height as of this encounter: 5' 5\" (1.651 m).    Weight as of this encounter: 112 lb (50.8 kg).  BP completed using cuff size: leonid Archuleta CMA    Health Maintenance Due   Topic Date Due     TOBACCO CESSATION COUNSELING Q1 YR  1975     LIPID MONITORING Q1 YEAR  04/27/2016     EYE EXAM Q1 YEAR  05/14/2016     PAP Q6 MOS DIAGNOSTIC  06/22/2017     INFLUENZA VACCINE (SYSTEM ASSIGNED)  09/01/2017     Health Maintenance reviewed at today's visit patient asked to schedule/complete:   Immunizations:  Patient agrees to schedule    "

## 2017-11-09 NOTE — MR AVS SNAPSHOT
After Visit Summary   11/9/2017    Celine Judd    MRN: [id number]           Patient Information     Date Of Birth          1975        Visit Information        Provider Department      11/9/2017 11:00 AM Latonia Nguyen MD [facility]        Today's Diagnoses     Major depression in complete remission (H) on meds since 4-24-15; onset 2011-issue of FMLA     -  1    Screening for malignant neoplasm of cervix        Tobacco use disorder        Need for prophylactic vaccination and inoculation against influenza        Benign essential hypertension-uncontrolled- issue of increasing meds           Care Instructions    1. Increase the toprol from 50mgm 2 x a d ay  To 100mgm bid  To lower BP , heart rate and it has a calming effect     2. Your blood pressure has been high or you are starting a new medication for it :   Please take 2-4 blood pressures and heart rates a week and write them down with date, time of day and location and bring this record in in 3-5 weeks. The optimal blood  pressure is < 140/80 or close to this most of the time.    3. QUIT SMOKING !!!!    Tissue heals better if no nicotine around    4. I need to see you by next week  We will start celexa next week if things no better     5.  You need a PAP and physical     6.           Follow-ups after your visit        Follow-up notes from your care team     Return in about 5 days (around 11/14/2017) for Routine Visit .      Your next 10 appointments already scheduled     Dec 06, 2017  1:30 PM CST   PHYSICAL with Radha Baptiste MD   [facility]    [address]  [facility] [id number] [phone]              Who to contact     If you have questions or need follow up information about today's clinic visit or your schedule please contact [facility] directly at [phone].  Normal or non-critical lab and  "imaging results will be communicated to you by MyChart, letter or phone within 4 business days after the clinic has received the results. If you do not hear from us within 7 days, please contact the clinic through [employer] or phone. If you have a critical or abnormal lab result, we will notify you by phone as soon as possible.  Submit refill requests through [employer] or call your pharmacy and they will forward the refill request to us. Please allow 3 business days for your refill to be completed.          Additional Information About Your Visit        [employer] Information     [employer] lets you send messages to your doctor, view your test results, renew your prescriptions, schedule appointments and more. To sign up, go to [email or web address] . Click on \"Log in\" on the left side of the screen, which will take you to the Welcome page. Then click on \"Sign up Now\" on the right side of the page.     You will be asked to enter the access code listed below, as well as some personal information. Please follow the directions to create your username and password.     Your access code is: [email or web address]  Expires: 12/10/2017  8:41 AM     Your access code will  in 90 days. If you need help or a new code, please call your [place] clinic or [phone].        Care EveryWhere ID     This is your Care EveryWhere ID. This could be used by other organizations to access your [place] medical records  [id number]        Your Vitals Were     Pulse Temperature Respirations Height Last Period Pulse Oximetry    87 98.2  F (36.8  C) (Tympanic) 12 5' 5\" (1.651 m) (LMP Unknown) 99%    Breastfeeding? BMI (Body Mass Index)                No 18.64 kg/m2           Blood Pressure from Last 3 Encounters:   17 (!) 160/110   17 124/68   16 (!) 168/110    Weight from Last 3 Encounters:   17 112 lb (50.8 kg)   17 114 lb (51.7 kg)   16 112 lb 11.2 oz (51.1 kg)              We Performed the Following     " DEPRESSION ACTION PLAN (DAP)     TOBACCO CESSATION ORDER FOR           Today's Medication Changes          These changes are accurate as of: 11/9/17 12:34 PM.  If you have any questions, ask your nurse or doctor.               These medicines have changed or have updated prescriptions.        Dose/Directions    metoprolol 100 MG tablet   Commonly known as:  LOPRESSOR   This may have changed:    - medication strength  - how much to take   Used for:  Benign essential hypertension   Changed by:  Latonia Nguyen MD        Dose:  100 mg   Take 1 tablet (100 mg) by mouth 2 times daily   Quantity:  90 tablet   Refills:  0            Where to get your medicines      Some of these will need a paper prescription and others can be bought over the counter.  Ask your nurse if you have questions.     Bring a paper prescription for each of these medications     metoprolol 100 MG tablet                Primary Care Provider Office Phone # Fax #    Latonia Nguyen [phone] [address] [facility] [address]        Equal Access to Services     [facility]: Hadii aad ku hadasho Soomaali, waaxda luqadaha, qaybta kaalmada adeegyada, waxay idiin olivern dana alcantar . So [facility] [phone].    ATENCIÓN: Si habla español, tiene a iglesias disposición servicios gratuitos de asistencia lingüística. Llame al [phone].    We comply with applicable federal civil rights laws and [place] laws. We do not discriminate on the basis of race, color, national origin, age, disability, sex, sexual orientation, or gender identity.            Thank you!     Thank you for choosing [facility]  for your care. Our goal is always to provide you with excellent care. Hearing back from our patients is one way we can continue to improve our services. Please take a few minutes to complete the written survey that you may receive in the mail after your visit with us. Thank  you!             Your Updated Medication List - Protect others around you: Learn how to safely use, store and throw away your medicines at www.disposemymeds.org.          This list is accurate as of: 11/9/17 12:34 PM.  Always use your most recent med list.                   Brand Name Dispense Instructions for use Diagnosis    acetaminophen 500 MG tablet    TYLENOL    100 tablet    Take 2 tablets po q 6 hr prn pain    Acute pain of left knee       amLODIPine 10 MG tablet    NORVASC    90 tablet    Take 1 tablet (10 mg) by mouth daily    Benign essential hypertension, Hypokalemia       BENADRYL PO           cholecalciferol 5000 UNITS Caps capsule    vitamin D3    90 capsule    Take 1 capsule (5,000 Units) by mouth daily    Vitamin D deficiency disease       loratadine 10 MG tablet    CLARITIN    90 tablet    Take 1 tablet (10 mg) by mouth daily    Benign essential hypertension, Hypokalemia       medroxyPROGESTERone 150 MG/ML injection    DEPO-PROVERA    1 mL    Inject 1 mL (150 mg) into the muscle every 3 months    Contraception       metoprolol 100 MG tablet    LOPRESSOR    90 tablet    Take 1 tablet (100 mg) by mouth 2 times daily    Benign essential hypertension       naproxen 500 MG tablet    NAPROSYN    60 tablet    Take 1 tablet (500 mg) by mouth 2 times daily (with meals)    Pain in joint involving ankle and foot, left       potassium chloride SA 10 MEQ CR tablet    K-DUR/KLOR-CON M    20 tablet    Take 1 tablet (10 mEq) by mouth 2 times daily    Hypokalemia       ranitidine 150 MG tablet    ZANTAC    180 tablet    Take 1 tablet (150 mg) by mouth 2 times daily    Gastroesophageal reflux disease without esophagitis

## 2017-11-09 NOTE — LETTER
My Depression Action Plan  Name: Celine Judd   Date of Birth 1975  Date: 11/9/2017    My doctor: Latonia Nguyen   My clinic: [address] [address]  [phone]          GREEN    ZONE   Good Control    What it looks like:     Things are going generally well. You have normal up s and down s. You may even feel depressed from time to time, but bad moods usually last less than a day.   What you need to do:  1. Continue to care for yourself (see self care plan)  2. Check your depression survival kit and update it as needed  3. Follow your physician s recommendations including any medication.  4. Do not stop taking medication unless you consult with your physician first.           YELLOW         ZONE Getting Worse    What it looks like:     Depression is starting to interfere with your life.     It may be hard to get out of bed; you may be starting to isolate yourself from others.    Symptoms of depression are starting to last most all day and this has happened for several days.     You may have suicidal thoughts but they are not constant.   What you need to do:     1. Call your care team, your response to treatment will improve if you keep your care team informed of your progress. Yellow periods are signs an adjustment may need to be made.     2. Continue your self-care, even if you have to fake it!    3. Talk to someone in your support network    4. Open up your depression survival kit           RED    ZONE Medical Alert - Get Help    What it looks like:     Depression is seriously interfering with your life.     You may experience these or other symptoms: You can t get out of bed most days, can t work or engage in other necessary activities, you have trouble taking care of basic hygiene, or basic responsibilities, thoughts of suicide or death that will not go away, self-injurious behavior.     What  you need to do:  1. Call your care team and request a same-day appointment. If they are not available (weekends or after hours) call your local crisis line, emergency room or 911.      Electronically signed by: Latonia Nguyen, November 9, 2017    Depression Self Care Plan / Survival Kit    Self-Care for Depression  Here s the deal. Your body and mind are really not as separate as most people think.  What you do and think affects how you feel and how you feel influences what you do and think. This means if you do things that people who feel good do, it will help you feel better.  Sometimes this is all it takes.  There is also a place for medication and therapy depending on how severe your depression is, so be sure to consult with your medical provider and/ or Behavioral Health Consultant if your symptoms are worsening or not improving.     In order to better manage my stress, I will:    Exercise  Get some form of exercise, every day. This will help reduce pain and release endorphins, the  feel good  chemicals in your brain. This is almost as good as taking antidepressants!  This is not the same as joining a gym and then never going! (they count on that by the way ) It can be as simple as just going for a walk or doing some gardening, anything that will get you moving.      Hygiene   Maintain good hygiene (Get out of bed in the morning, Make your bed, Brush your teeth, Take a shower, and Get dressed like you were going to work, even if you are unemployed).  If your clothes don't fit try to get ones that do.    Diet  I will strive to eat foods that are good for me, drink plenty of water, and avoid excessive sugar, caffeine, alcohol, and other mood-altering substances.  Some foods that are helpful in depression are: complex carbohydrates, B vitamins, flaxseed, fish or fish oil, fresh fruits and vegetables.    Psychotherapy  I agree to participate in Individual Therapy (if recommended).    Medication  If prescribed  medications, I agree to take them.  Missing doses can result in serious side effects.  I understand that drinking alcohol, or other illicit drug use, may cause potential side effects.  I will not stop my medication abruptly without first discussing it with my provider.    Staying Connected With Others  I will stay in touch with my friends, family members, and my primary care provider/team.    Use your imagination  Be creative.  We all have a creative side; it doesn t matter if it s oil painting, sand castles, or mud pies! This will also kick up the endorphins.    Witness Beauty  (AKA stop and smell the roses) Take a look outside, even in mid-winter. Notice colors, textures. Watch the squirrels and birds.     Service to others  Be of service to others.  There is always someone else in need.  By helping others we can  get out of ourselves  and remember the really important things.  This also provides opportunities for practicing all the other parts of the program.    Humor  Laugh and be silly!  Adjust your TV habits for less news and crime-drama and more comedy.    Control your stress  Try breathing deep, massage therapy, biofeedback, and meditation. Find time to relax each day.     My support system    Clinic Contact:  Phone number:    Contact 1:  Phone number:    Contact 2:  Phone number:    Hindu/:  Phone number:    Therapist:  Phone number:    [facility] crisis center:    Phone number:    Other community support:  Phone number:

## 2017-11-14 ENCOUNTER — OFFICE VISIT (OUTPATIENT)
Dept: FAMILY MEDICINE | Facility: CLINIC | Age: 42
End: 2017-11-14
Payer: COMMERCIAL

## 2017-11-14 VITALS
HEART RATE: 72 BPM | DIASTOLIC BLOOD PRESSURE: 82 MMHG | HEIGHT: 65 IN | SYSTOLIC BLOOD PRESSURE: 136 MMHG | WEIGHT: 117 LBS | BODY MASS INDEX: 19.49 KG/M2 | RESPIRATION RATE: 14 BRPM | OXYGEN SATURATION: 97 % | TEMPERATURE: 97.2 F

## 2017-11-14 DIAGNOSIS — F17.200 TOBACCO USE DISORDER: ICD-10-CM

## 2017-11-14 DIAGNOSIS — R00.0 SINUS TACHYCARDIA: ICD-10-CM

## 2017-11-14 DIAGNOSIS — R87.612 PAPANICOLAOU SMEAR OF CERVIX WITH LOW GRADE SQUAMOUS INTRAEPITHELIAL LESION (LGSIL): ICD-10-CM

## 2017-11-14 DIAGNOSIS — F32.5 MAJOR DEPRESSION IN COMPLETE REMISSION (H): ICD-10-CM

## 2017-11-14 DIAGNOSIS — I10 BENIGN ESSENTIAL HYPERTENSION: Primary | ICD-10-CM

## 2017-11-14 PROCEDURE — 99213 OFFICE O/P EST LOW 20 MIN: CPT | Performed by: FAMILY MEDICINE

## 2017-11-14 ASSESSMENT — ANXIETY QUESTIONNAIRES
GAD7 TOTAL SCORE: 8
3. WORRYING TOO MUCH ABOUT DIFFERENT THINGS: SEVERAL DAYS
7. FEELING AFRAID AS IF SOMETHING AWFUL MIGHT HAPPEN: SEVERAL DAYS
2. NOT BEING ABLE TO STOP OR CONTROL WORRYING: MORE THAN HALF THE DAYS
6. BECOMING EASILY ANNOYED OR IRRITABLE: SEVERAL DAYS
5. BEING SO RESTLESS THAT IT IS HARD TO SIT STILL: NOT AT ALL
1. FEELING NERVOUS, ANXIOUS, OR ON EDGE: SEVERAL DAYS
IF YOU CHECKED OFF ANY PROBLEMS ON THIS QUESTIONNAIRE, HOW DIFFICULT HAVE THESE PROBLEMS MADE IT FOR YOU TO DO YOUR WORK, TAKE CARE OF THINGS AT HOME, OR GET ALONG WITH OTHER PEOPLE: SOMEWHAT DIFFICULT

## 2017-11-14 ASSESSMENT — PATIENT HEALTH QUESTIONNAIRE - PHQ9
5. POOR APPETITE OR OVEREATING: MORE THAN HALF THE DAYS
SUM OF ALL RESPONSES TO PHQ QUESTIONS 1-9: 3

## 2017-11-14 NOTE — MR AVS SNAPSHOT
"              After Visit Summary   11/14/2017    Celine Judd    MRN: [id number]           Patient Information     Date Of Birth          1975        Visit Information        Provider Department      11/14/2017 11:40 AM Latonia Nguyen MD [facility]        Today's Diagnoses     Screening for malignant neoplasm of cervix    -  1      Care Instructions    1. Please schedule a PAP a s you are overdue     2. Bring me your BP and heart rate record 2 weeks from now  As will be 4 weeks since up the toprol    3. Get GYN to do a PAP as you are overdue     4. Do your eye exam           Follow-ups after your visit        Your next 10 appointments already scheduled     Dec 06, 2017  1:30 PM CST   PHYSICAL with Radha Baptiste MD   [facility] ([facility])    [address]  [facility] [id number] [phone]              Who to contact     If you have questions or need follow up information about today's clinic visit or your schedule please contact [facility] directly at [phone].  Normal or non-critical lab and imaging results will be communicated to you by MyChart, letter or phone within 4 business days after the clinic has received the results. If you do not hear from us within 7 days, please contact the clinic through [employer] or phone. If you have a critical or abnormal lab result, we will notify you by phone as soon as possible.  Submit refill requests through [employer] or call your pharmacy and they will forward the refill request to us. Please allow 3 business days for your refill to be completed.          Additional Information About Your Visit        MyChart Information     [employer] lets you send messages to your doctor, view your test results, renew your prescriptions, schedule appointments and more. To sign up, go to [email or web address] . Click on \"Log in\" on the left side of the " "screen, which will take you to the Welcome page. Then click on \"Sign up Now\" on the right side of the page.     You will be asked to enter the access code listed below, as well as some personal information. Please follow the directions to create your username and password.     Your access code is: [email or web address]  Expires: 12/10/2017  8:41 AM     Your access code will  in 90 days. If you need help or a new code, please call your [place] clinic or [phone].        Care EveryWhere ID     This is your Care EveryWhere ID. This could be used by other organizations to access your [place] medical records  [id number]        Your Vitals Were     Pulse Temperature Respirations Height Last Period Pulse Oximetry    72 97.2  F (36.2  C) (Tympanic) 14 5' 5\" (1.651 m) (LMP Unknown) 97%    Breastfeeding? BMI (Body Mass Index)                No 19.47 kg/m2           Blood Pressure from Last 3 Encounters:   17 136/82   17 (!) 160/110   17 124/68    Weight from Last 3 Encounters:   17 117 lb (53.1 kg)   17 112 lb (50.8 kg)   17 114 lb (51.7 kg)              Today, you had the following     No orders found for display       Primary Care Provider Office Phone # Fax #    Latonia Nguyen [phone]       [address] [address]        Equal Access to Services     [facility]: Hadii virginie seaman hadasho Sogloriaali, waaxda luqadaha, qaybta kaalmada adeegyarick, luis gloria. So [facility] [phone].    ATENCIÓN: Si habla español, tiene a iglesias disposición servicios gratuitos de asistencia lingüística. Llame al [phone].    We comply with applicable federal civil rights laws and [place] laws. We do not discriminate on the basis of race, color, national origin, age, disability, sex, sexual orientation, or gender identity.            Thank you!     Thank you for choosing [facility]  for your care. Our " goal is always to provide you with excellent care. Hearing back from our patients is one way we can continue to improve our services. Please take a few minutes to complete the written survey that you may receive in the mail after your visit with us. Thank you!             Your Updated Medication List - Protect others around you: Learn how to safely use, store and throw away your medicines at www.disposemymeds.org.          This list is accurate as of: 11/14/17  1:02 PM.  Always use your most recent med list.                   Brand Name Dispense Instructions for use Diagnosis    acetaminophen 500 MG tablet    TYLENOL    100 tablet    Take 2 tablets po q 6 hr prn pain    Acute pain of left knee       amLODIPine 10 MG tablet    NORVASC    90 tablet    Take 1 tablet (10 mg) by mouth daily    Benign essential hypertension, Hypokalemia       BENADRYL PO           cholecalciferol 5000 UNITS Caps capsule    vitamin D3    90 capsule    Take 1 capsule (5,000 Units) by mouth daily    Vitamin D deficiency disease       loratadine 10 MG tablet    CLARITIN    90 tablet    Take 1 tablet (10 mg) by mouth daily    Benign essential hypertension, Hypokalemia       medroxyPROGESTERone 150 MG/ML injection    DEPO-PROVERA    1 mL    Inject 1 mL (150 mg) into the muscle every 3 months    Contraception       metoprolol 100 MG tablet    LOPRESSOR    90 tablet    Take 1 tablet (100 mg) by mouth 2 times daily    Benign essential hypertension       naproxen 500 MG tablet    NAPROSYN    60 tablet    Take 1 tablet (500 mg) by mouth 2 times daily (with meals)    Pain in joint involving ankle and foot, left       potassium chloride SA 10 MEQ CR tablet    K-DUR/KLOR-CON M    20 tablet    Take 1 tablet (10 mEq) by mouth 2 times daily    Hypokalemia       ranitidine 150 MG tablet    ZANTAC    180 tablet    Take 1 tablet (150 mg) by mouth 2 times daily    Gastroesophageal reflux disease without esophagitis

## 2017-11-14 NOTE — PROGRESS NOTES
SUBJECTIVE:   Celine Judd is a 42 year old female who presents to clinic today for the following health issues:        Hypertension Follow-up      Outpatient blood pressures are not being checked. Just bought a BP cuff     Here > 160/100 to 180/110 but now on double the toprol to 100mgm bid  BP =130-40/ -lower on retake     HR down to 70s from     Med: toprol 50mgm bid, norvasc 10mgm     HR also hi :      Low Salt Diet: no added salt      Amount of exercise or physical activity: None    Problems taking medications regularly: No    Medication side effects: none    Diet: regular (no restrictions)    Depression and Anxiety Follow-Up    Status since last visit: Worsened with mom's recet Dxes      Now better as realizes her mom;s Dxes are not that bad  And also on increased B blocker     Still anxious but < 8     Other associated symptoms:None    Complicating factors:     Significant life event: mom finished chemo for breast ca and now has thyroid ca and     Current substance abuse: None    Meds: none     PHQ-9 Score and MyChart F/U Questions 6/14/2016 7/13/2016 6/23/2017   Total Score 3 1 0   Q9: Suicide Ideation Not at all Not at all Not at all     No flowsheet data found.    PHQ-9  English=3  PHQ-9   Any Language  GAD7=8  Suicide Assessment Five-step Evaluation and Treatment (SAFE-T)    TOBACCO ABUSE    -37 p yr hx till cut down to 1/5 ppd at 36 y/o and now increased with the stress   - rev of risks   -4 pk yr hx    -states now down to 1-2 cigs/d   -encouraged       Problem list and histories reviewed & adjusted, as indicated.  Additional history: as documented    Labs reviewed in EPIC    Reviewed and updated as needed this visit by clinical staff  Tobacco  Allergies  Meds  Problems  Med Hx  Surg Hx  Fam Hx  Soc Hx        Reviewed and updated as needed this visit by Provider  Allergies  Meds  Problems         ROS:  C: NEGATIVE for fever, chills, change in weight  I: NEGATIVE for  "worrisome rashes, moles or lesions  E: NEGATIVE for vision changes or irritation  E/M: NEGATIVE for ear, mouth and throat problems  R: NEGATIVE for significant cough or SOB  B: NEGATIVE for masses, tenderness or discharge  CV: NEGATIVE for chest pain, palpitations or peripheral edema  GI: NEGATIVE for nausea, abdominal pain, heartburn, or change in bowel habits  : NEGATIVE for frequency, dysuria, or hematuria  M: NEGATIVE for significant arthralgias or myalgia  N: NEGATIVE for weakness, dizziness or paresthesias  E: NEGATIVE for temperature intolerance, skin/hair changes  H: NEGATIVE for bleeding problems  P: NEGATIVE for changes in mood or affect  PSYCHIATRIC: POSITIVE foragitation, anxiety, concentration difficulty, depressed mood, Hx anxiety, Hx depression and fatigue    OBJECTIVE:     /82  Pulse 72  Temp 97.2  F (36.2  C) (Tympanic)  Resp 14  Ht 5' 5\" (1.651 m)  Wt 117 lb (53.1 kg)  LMP  (LMP Unknown)  SpO2 97%  Breastfeeding? No  BMI 19.47 kg/m2  Body mass index is 19.47 kg/(m^2).  GENERAL: healthy, alert, no distress, frail and slim   EYES: Eyes grossly normal to inspection, PERRL and conjunctivae and sclerae normal  RESP: lungs clear to auscultation - no rales, rhonchi or wheezes  CV: regular rate and rhythm, normal S1 S2, no S3 or S4, no murmur, click or rub, no peripheral edema and peripheral pulses strong  MS: no gross musculoskeletal defects noted, no edema  SKIN: no suspicious lesions or rashes  NEURO: Normal strength and tone, mentation intact and speech normal  PSYCH: mentation appears normal, concentration poor, affect normal/bright, tearful, anxious and appearance well groomed    Diagnostic Test Results:  none     ASSESSMENT/PLAN:               ICD-10-CM    1. Screening for malignant neoplasm of cervix Z12.4        Patient Instructions   1. Please schedule a PAP a s you are overdue       Latonia Nguyen MD  [facility]    Time spent with the patient " 26mins, more than 50% in counseling and coordinating care, Re above medical problems.  Reviewed her causes of depression , ans thus, the hi BP  With her mom's Dx of thyroid ca and PAD needding surgery  And having just completed chemo fo r her breast ca , all of which have exacerbated her depression again     Hopefully the toprol will lower her HR which is tachycardiac, lower her BP  Which has contd > 160/100 & NOW IS higher , as well as calm her .    She may not need meds if this helps and she sees a clearer picture of her mom's illnesses after further DX etc  As this are both fixable diseasese   Has increased her swmoking  And explained to pt that this will not help, but only make anxiety etc worse   Weight management plan noted, stable and monitoring     Did FMLA with permission of pt with Dx of depression , giving up to 1 d / wk     Latonia Nguyen MD        SUBJECTIVE:   Celine Judd is a 42 year old female who presents to clinic today for the following health issues:    Hypertension Follow-up      Outpatient blood pressures are not being checked.    Low Salt Diet: low salt        Amount of exercise or physical activity: None    Problems taking medications regularly: No    Medication side effects: none    Diet: regular (no restrictions)            Problem list and histories reviewed & adjusted, as indicated.  Additional history: as documented    Labs reviewed in EPIC    Reviewed and updated as needed this visit by clinical staffTobacco  Allergies  Meds  Problems  Med Hx  Surg Hx  Fam Hx  Soc Hx        Reviewed and updated as needed this visit by Provider         ROS:  C: NEGATIVE for fever, chills, change in weight  I: NEGATIVE for worrisome rashes, moles or lesions  E: NEGATIVE for vision changes or irritation  E/M: NEGATIVE for ear, mouth and throat problems  R: NEGATIVE for significant cough or SOB  B: NEGATIVE for masses, tenderness or discharge  CV: NEGATIVE for chest pain, palpitations or  "peripheral edema  GI: NEGATIVE for nausea, abdominal pain, heartburn, or change in bowel habits  : NEGATIVE for frequency, dysuria, or hematuria  M: NEGATIVE for significant arthralgias or myalgia  N: NEGATIVE for weakness, dizziness or paresthesias  E: NEGATIVE for temperature intolerance, skin/hair changes  H: NEGATIVE for bleeding problems  PSYCHIATRIC: POSITIVE for, anxiety, HX anxiety and HX depression    OBJECTIVE:     /82  Pulse 72  Temp 97.2  F (36.2  C) (Tympanic)  Resp 14  Ht 5' 5\" (1.651 m)  Wt 117 lb (53.1 kg)  LMP  (LMP Unknown)  SpO2 97%  Breastfeeding? No  BMI 19.47 kg/m2  Body mass index is 19.47 kg/(m^2).  GENERAL: healthy, alert and no distress  EYES: Eyes grossly normal to inspection, PERRL and conjunctivae and sclerae normal  NECK: no adenopathy, no asymmetry, masses, or scars and thyroid normal to palpation  RESP: lungs clear to auscultation - no rales, rhonchi or wheezes  MS: no gross musculoskeletal defects noted, no edema  SKIN: no suspicious lesions or rashes  NEURO: Normal strength and tone, mentation intact and speech normal  PSYCH: mentation appears normal, affect normal/bright, anxious and appearance well groomed    Diagnostic Test Results:  none     ASSESSMENT/PLAN:               ICD-10-CM    1. Benign essential hypertension-now in control on increased B blocker  I10    2. Sinus tachycardia-resolved on increased B blocker R00.0    3. Major depression in complete remission (H) on meds since 4-24-15; onset 2011 F32.5    4. Tobacco use disorder: [age]y/o (3-13) @ 1/4 ppd=3-4 pk yr hx and continues at 1/5 ppd since -now 1-2 cig /d F17.200    5. LGSIL pap with + HR HPV R87.612        Patient Instructions   1. Please schedule a PAP a s you are overdue     2. Bring me your BP and heart rate record 2 weeks from now  As will be 4 weeks since up the toprol    3. Get GYN to do a PAP as you are overdue & they did not do on last exam      4. Do your eye exam       Latonia Nguyen, " MD  [facility]

## 2017-11-14 NOTE — NURSING NOTE
"Chief Complaint   Patient presents with     Recheck Medication     BP (!) 140/100  Pulse 72  Temp 97.2  F (36.2  C) (Tympanic)  Resp 14  Ht 5' 5\" (1.651 m)  Wt 117 lb (53.1 kg)  LMP  (LMP Unknown)  SpO2 97%  Breastfeeding? No  BMI 19.47 kg/m2 Estimated body mass index is 19.47 kg/(m^2) as calculated from the following:    Height as of this encounter: 5' 5\" (1.651 m).    Weight as of this encounter: 117 lb (53.1 kg).  BP completed using cuff size: leonid Archuleta CMA    Health Maintenance Due   Topic Date Due     LIPID MONITORING Q1 YEAR  04/27/2016     EYE EXAM Q1 YEAR  05/14/2016     PAP Q6 [facility] DI[facility]NOSTIC  06/22/2017     Health Maintenance reviewed at today's visit patient asked to schedule/complete:   Immunizations:  Patient agrees to schedule    "

## 2017-11-14 NOTE — PATIENT INSTRUCTIONS
1. Please schedule a PAP a s you are overdue     2. Bring me your BP and heart rate record 2 weeks from now  As will be 4 weeks since up the toprol    3. Get GYN to do a PAP as you are overdue & they did not do on last exam      4. Do your eye exam

## 2017-11-15 PROBLEM — R00.0 SINUS TACHYCARDIA: Status: ACTIVE | Noted: 2017-11-15

## 2017-11-15 ASSESSMENT — ANXIETY QUESTIONNAIRES: GAD7 TOTAL SCORE: 8

## 2017-11-27 ENCOUNTER — ALLIED HEALTH/NURSE VISIT (OUTPATIENT)
Dept: NURSING | Facility: CLINIC | Age: 42
End: 2017-11-27
Payer: COMMERCIAL

## 2017-11-27 PROCEDURE — 96372 THER/PROPH/DIAG INJ SC/IM: CPT

## 2017-11-27 NOTE — PROGRESS NOTES
Follow Up Injection    Patient returning during stated date range given at previous visit: Yes      If here at the correct interval:   BP Readings from Last 1 Encounters:   11/14/17 136/82     Wt Readings from Last 1 Encounters:   11/14/17 117 lb (53.1 kg)       Last Pap/exam date: 12/22/16      Side effects or problems with last injection?  No.  Date range is given to patient for next dose: February 12-26 2018    See Medication Note for administration information    Staff Sig: Jessica Coello CMA

## 2018-03-01 ENCOUNTER — ALLIED HEALTH/NURSE VISIT (OUTPATIENT)
Dept: NURSING | Facility: CLINIC | Age: 43
End: 2018-03-01
Payer: COMMERCIAL

## 2018-03-01 VITALS — DIASTOLIC BLOOD PRESSURE: 82 MMHG | SYSTOLIC BLOOD PRESSURE: 120 MMHG

## 2018-03-01 LAB — BETA HCG QUAL IFA URINE: NEGATIVE

## 2018-03-01 PROCEDURE — 84703 CHORIONIC GONADOTROPIN ASSAY: CPT | Performed by: FAMILY MEDICINE

## 2018-03-01 PROCEDURE — 99207 ZZC NO CHARGE LOS: CPT

## 2018-03-01 PROCEDURE — 96372 THER/PROPH/DIAG INJ SC/IM: CPT

## 2018-03-01 NOTE — MR AVS SNAPSHOT
"              After Visit Summary   3/1/2018    Celine Judd    MRN: [id number]           Patient Information     Date Of Birth          1975        Visit Information        Provider Department      3/1/2018 8:30 AM BX NURSE [facility]        Today's Diagnoses     Contraception    -  1       Follow-ups after your visit        Who to contact     If you have questions or need follow up information about today's clinic visit or your schedule please contact [facility] directly at [phone].  Normal or non-critical lab and imaging results will be communicated to you by MediSenshart, letter or phone within 4 business days after the clinic has received the results. If you do not hear from us within 7 days, please contact the clinic through Needlt or phone. If you have a critical or abnormal lab result, we will notify you by phone as soon as possible.  Submit refill requests through SimpleLegal or call your pharmacy and they will forward the refill request to us. Please allow 3 business days for your refill to be completed.          Additional Information About Your Visit        MyChart Information     SimpleLegal lets you send messages to your doctor, view your test results, renew your prescriptions, schedule appointments and more. To sign up, go to [email or web address] . Click on \"Log in\" on the left side of the screen, which will take you to the Welcome page. Then click on \"Sign up Now\" on the right side of the page.     You will be asked to enter the access code listed below, as well as some personal information. Please follow the directions to create your username and password.     Your access code is: [email or web address]  Expires: 2018  9:58 AM     Your access code will  in 90 days. If you need help or a new code, please call your [facility] or [phone].        Care EveryWhere ID     This is your Care EveryWhere ID. This could be used by " other organizations to access your [place] medical records  [id number]         Blood Pressure from Last 3 Encounters:   03/01/18 120/82   11/14/17 136/82   11/09/17 (!) 160/110    Weight from Last 3 Encounters:   11/14/17 117 lb (53.1 kg)   11/09/17 112 lb (50.8 kg)   06/23/17 114 lb (51.7 kg)              We Performed the Following     Beta HCG Qual, Urine - [facility] and [place] ([id number])     Medroxyprogesterone inj/1mg (Depo Provera J-Code)     THER/PROPH/DIAG INJ, SC/IM        Primary Care Provider Office Phone # Fax #    Latonia Divya Nguyen [phone] [address] [facility] [address]        Equal Access to Services     [facility] : Hadii aad ku hadasho Soomaali, waaxda luqadaha, qaybta kaalmada adeegyada, luis alcantar . So [facility] [phone].    ATENCIÓN: Si habla español, tiene a iglesias disposición servicios gratuitos de asistencia lingüística. [place] al [phone].    We comply with applicable federal civil rights laws and [place] laws. We do not discriminate on the basis of race, color, national origin, age, disability, sex, sexual orientation, or gender identity.            Thank you!     [facility] you [facility]. Our goal is always to provide you with excellent care. Hearing back from our patients is one way we can continue to improve our services. Please take a few minutes to complete the written survey that you may receive in the mail after your visit with us. Thank you!             Your Updated Medication List - Protect others around you: Learn how to safely use, store and throw away your medicines at www.disposemymeds.org.          This list is accurate as of 3/1/18  9:58 AM.  Always use your most recent med list.                   Brand Name Dispense Instructions for use Diagnosis    acetaminophen 500 MG tablet    TYLENOL    100 tablet    Take 2 tablets po q 6 hr prn pain    Acute  pain of left knee       amLODIPine 10 MG tablet    NORVASC    90 tablet    Take 1 tablet (10 mg) by mouth daily    Benign essential hypertension, Hypokalemia       BENADRYL PO           cholecalciferol 5000 UNITS Caps capsule    vitamin D3    90 capsule    Take 1 capsule (5,000 Units) by mouth daily    Vitamin D deficiency disease       loratadine 10 MG tablet    CLARITIN    90 tablet    Take 1 tablet (10 mg) by mouth daily    Benign essential hypertension, Hypokalemia       medroxyPROGESTERone 150 MG/ML injection    DEPO-PROVERA    1 mL    Inject 1 mL (150 mg) into the muscle every 3 months    Contraception       metoprolol tartrate 100 MG tablet    LOPRESSOR    90 tablet    Take 1 tablet (100 mg) by mouth 2 times daily    Benign essential hypertension       naproxen 500 MG tablet    NAPROSYN    60 tablet    Take 1 tablet (500 mg) by mouth 2 times daily (with meals)    Pain in joint involving ankle and foot, left       potassium chloride SA 10 MEQ CR tablet    K-DUR/KLOR-CON M    20 tablet    Take 1 tablet (10 mEq) by mouth 2 times daily    Hypokalemia       ranitidine 150 MG tablet    ZANTAC    180 tablet    Take 1 tablet (150 mg) by mouth 2 times daily    Gastroesophageal reflux disease without esophagitis

## 2018-03-01 NOTE — NURSING NOTE
"Chief Complaint   Patient presents with     Imm/Inj     Depo-Provera       Initial /82 Estimated body mass index is 19.47 kg/(m^2) as calculated from the following:    Height as of 11/14/17: 5' 5\" (1.651 m).    Weight as of 11/14/17: 117 lb (53.1 kg).  Medication Reconciliation: complete     The following medication was given:   Prior to injection verified patient identity using patient's name and date of birth.  MEDICATION: Medroxyprogesterone 150 mg  ROUTE: IM  SITE: Deltoid - Right---per patient request  DOSE: 150/ml  LOT #: [id number]  :  [employer]   EXPIRATION DATE:  04/2020  NDC#: [address]-3  Lorena Calderón LPN   "

## 2018-03-16 ENCOUNTER — OFFICE VISIT (OUTPATIENT)
Dept: FAMILY MEDICINE | Facility: CLINIC | Age: 43
End: 2018-03-16
Payer: COMMERCIAL

## 2018-03-16 ENCOUNTER — RADIANT APPOINTMENT (OUTPATIENT)
Dept: MAMMOGRAPHY | Facility: CLINIC | Age: 43
End: 2018-03-16
Payer: COMMERCIAL

## 2018-03-16 VITALS
DIASTOLIC BLOOD PRESSURE: 80 MMHG | HEIGHT: 65 IN | RESPIRATION RATE: 18 BRPM | OXYGEN SATURATION: 98 % | BODY MASS INDEX: 18.83 KG/M2 | WEIGHT: 113 LBS | HEART RATE: 81 BPM | SYSTOLIC BLOOD PRESSURE: 130 MMHG | TEMPERATURE: 98.5 F

## 2018-03-16 DIAGNOSIS — Z12.31 VISIT FOR SCREENING MAMMOGRAM: ICD-10-CM

## 2018-03-16 DIAGNOSIS — R73.01 IMPAIRED FASTING GLUCOSE: ICD-10-CM

## 2018-03-16 DIAGNOSIS — E78.1 HYPERTRIGLYCERIDEMIA: ICD-10-CM

## 2018-03-16 DIAGNOSIS — E87.6 HYPOKALEMIA: ICD-10-CM

## 2018-03-16 DIAGNOSIS — J30.9 CHRONIC ALLERGIC RHINITIS, UNSPECIFIED SEASONALITY, UNSPECIFIED TRIGGER: ICD-10-CM

## 2018-03-16 DIAGNOSIS — F32.5 MAJOR DEPRESSION IN COMPLETE REMISSION (H): ICD-10-CM

## 2018-03-16 DIAGNOSIS — E44.1 MILD PROTEIN-CALORIE MALNUTRITION (H): ICD-10-CM

## 2018-03-16 DIAGNOSIS — Z00.00 ROUTINE GENERAL MEDICAL EXAMINATION AT A HEALTH CARE FACILITY: Primary | ICD-10-CM

## 2018-03-16 DIAGNOSIS — Z83.3 FAMILY HISTORY OF DIABETES MELLITUS: ICD-10-CM

## 2018-03-16 DIAGNOSIS — I10 BENIGN ESSENTIAL HYPERTENSION: ICD-10-CM

## 2018-03-16 DIAGNOSIS — Z12.4 SCREENING FOR MALIGNANT NEOPLASM OF CERVIX: ICD-10-CM

## 2018-03-16 LAB — HBA1C MFR BLD: 5 % (ref 4.3–6)

## 2018-03-16 PROCEDURE — G0145 SCR C/V CYTO,THINLAYER,RESCR: HCPCS | Performed by: FAMILY MEDICINE

## 2018-03-16 PROCEDURE — 80061 LIPID PANEL: CPT | Performed by: FAMILY MEDICINE

## 2018-03-16 PROCEDURE — 99396 PREV VISIT EST AGE 40-64: CPT | Performed by: FAMILY MEDICINE

## 2018-03-16 PROCEDURE — 36415 COLL VENOUS BLD VENIPUNCTURE: CPT | Performed by: FAMILY MEDICINE

## 2018-03-16 PROCEDURE — 99214 OFFICE O/P EST MOD 30 MIN: CPT | Mod: 25 | Performed by: FAMILY MEDICINE

## 2018-03-16 PROCEDURE — 84460 ALANINE AMINO (ALT) (SGPT): CPT | Performed by: FAMILY MEDICINE

## 2018-03-16 PROCEDURE — 87624 HPV HI-RISK TYP POOLED RSLT: CPT | Performed by: FAMILY MEDICINE

## 2018-03-16 PROCEDURE — 77067 SCR MAMMO BI INCL CAD: CPT | Mod: TC

## 2018-03-16 PROCEDURE — 80048 BASIC METABOLIC PNL TOTAL CA: CPT | Performed by: FAMILY MEDICINE

## 2018-03-16 PROCEDURE — 83036 HEMOGLOBIN GLYCOSYLATED A1C: CPT | Performed by: FAMILY MEDICINE

## 2018-03-16 RX ORDER — AMLODIPINE BESYLATE 5 MG/1
TABLET ORAL
Qty: 90 TABLET | Refills: 0 | Status: SHIPPED | OUTPATIENT
Start: 2018-03-16 | End: 2018-09-27

## 2018-03-16 RX ORDER — METOPROLOL TARTRATE 100 MG
100 TABLET ORAL 2 TIMES DAILY
Qty: 90 TABLET | Refills: 0 | Status: SHIPPED | OUTPATIENT
Start: 2018-03-16 | End: 2018-09-27

## 2018-03-16 RX ORDER — POTASSIUM CHLORIDE 750 MG/1
10 TABLET, EXTENDED RELEASE ORAL 2 TIMES DAILY
Qty: 20 TABLET | Refills: 0 | Status: CANCELLED | OUTPATIENT
Start: 2018-03-16

## 2018-03-16 RX ORDER — AMLODIPINE BESYLATE 10 MG/1
10 TABLET ORAL DAILY
Qty: 90 TABLET | Refills: 0 | Status: CANCELLED | OUTPATIENT
Start: 2018-03-16

## 2018-03-16 RX ORDER — LORATADINE 10 MG/1
10 TABLET ORAL DAILY
Qty: 90 TABLET | Refills: 3 | Status: SHIPPED | OUTPATIENT
Start: 2018-03-16 | End: 2018-09-27

## 2018-03-16 NOTE — MR AVS SNAPSHOT
After Visit Summary   3/16/2018    Celine Judd    MRN: [id number]           Patient Information     Date Of Birth          1975        Visit Information        Provider Department      3/16/2018 1:20 PM Latonia Nguyen MD [facility]        Today's Diagnoses     Routine general medical examination at a health care facility    -  1    Benign essential hypertension-uncontrolled- issue of increasing meds         Major depression in complete remission (H) on meds since 4-24-15; onset 2011        Mild protein-calorie malnutrition (H)        Hypokalemia        Impaired fasting glucose        Family history of diabetes mellitus        Hypertriglyceridemia        Chronic allergic rhinitis, unspecified seasonality, unspecified trigger        Screening for malignant neoplasm of cervix          Care Instructions      Preventive Health Recommendations  Female Ages 40 to 49    Yearly exam:     See your health care provider every year in order to  1. Review health changes.   2. Discuss preventive care.    3. Review your medicines if your doctor prescribed any.      Get a Pap test every three years (unless you have an abnormal result and your provider advises testing more often).      If you get Pap tests with HPV test, you only need to test every 5 years, unless you have an abnormal result. You do not need a Pap test if your uterus was removed (hysterectomy) and you have not had cancer.      You should be tested each year for STDs (sexually transmitted diseases), if you're at risk.       Ask your doctor if you should have a mammogram.      Have a colonoscopy (test for colon cancer) if someone in your family has had colon cancer or polyps before age 50.       Have a cholesterol test every 5 years.       Have a diabetes test (fasting glucose) after age 45. If you are at risk for diabetes, you should have this test every 3 years.    Shots: Get a flu shot each year.  Get a tetanus shot every 10 years.     Nutrition:     Eat at least 5 servings of fruits and vegetables each day.    Eat whole-grain bread, whole-wheat pasta and brown rice instead of white grains and rice.    Talk to your provider about Calcium and Vitamin D.     Lifestyle    Exercise at least 150 minutes a week (an average of 30 minutes a day, 5 days a week). This will help you control your weight and prevent disease.    Limit alcohol to one drink per day.    No smoking.     Wear sunscreen to prevent skin cancer.    See your dentist every six months for an exam and cleaning.    1. Please do your breast exam every mo, when you  Change the  calendar page or set an alarm on your cell phone Do a  visual check for dimples, inversion or indentation or any different position of the nipple Feel manually  for any 1cm or larger  size mass ie about the size of an almond Be sure to cover the entire area of both breasts : this extends back to the back on either side and from the collar bone to the bottom of the breasts where you can begin to feel ribs.    2. Get mammo  At [facility]    3. Your vitamin D is normal.  Please take 1,000 units in the summer and 2,000 units in the winter to maintain it     4. Cont the metoprolol  100mgm  2 x  A d     5. Restart the norvasc at 5 mgm     6. After 3-4 weeks do 3+ BPs and HRs and give them to me #########    7. You need to see an eye doctor --over due           Follow-ups after your visit        Who to contact     If you have questions or need follow up information about today's clinic visit or your schedule please contact [facility] directly at [phone].  Normal or non-critical lab and imaging results will be communicated to you by MyChart, letter or phone within 4 business days after the clinic has received the results. If you do not hear from us within 7 days, please contact the clinic through MyChart or phone. If you have a critical or abnormal lab  "result, we will notify you by phone as soon as possible.  Submit refill requests through [employer] or call your pharmacy and they will forward the refill request to us. Please allow 3 business days for your refill to be completed.          Additional Information About Your Visit        [employer] Information     [employer]Model lets you send messages to your doctor, view your test results, renew your prescriptions, schedule appointments and more. To sign up, go to [email or web address] . Click on \"Log in\" on the left side of the screen, which will take you to the Welcome page. Then click on \"Sign up Now\" on the right side of the page.     You will be asked to enter the access code listed below, as well as some personal information. Please follow the directions to create your username and password.     Your access code is: [email or web address]  Expires: 2018 10:58 AM     Your access code will  in 90 days. If you need help or a new code, please call your [facility] clinic or [phone].        Care EveryWhere ID     This is your Care EveryWhere ID. This could be used by other organizations to access your [facility] medical records  [id number]        Your Vitals Were     Pulse Temperature Respirations Height Last Period Pulse Oximetry    81 98.5  F (36.9  C) (Tympanic) 18 5' 5\" (1.651 m) (LMP Unknown) 98%    Breastfeeding? BMI (Body Mass Index)                No 18.8 kg/m2           Blood Pressure from Last 3 Encounters:   18 130/80   18 120/82   17 136/82    Weight from Last 3 Encounters:   18 113 lb (51.3 kg)   17 117 lb (53.1 kg)   17 112 lb (50.8 kg)              We Performed the Following     ALT     Basic metabolic panel     DEPRESSION ACTION PLAN (DAP)     Hemoglobin A1c     Lipid panel reflex to direct LDL Fasting     Pap imaged thin layer screen with HPV - recommended age 30 - 65 years (select HPV order below)          Today's Medication Changes          These changes are accurate " as of 3/16/18  1:47 PM.  If you have any questions, ask your nurse or doctor.               These medicines have changed or have updated prescriptions.        Dose/Directions    * amLODIPine 10 MG tablet   Commonly known as:  NORVASC   This may have changed:  Another medication with the same name was added. Make sure you understand how and when to take each.   Used for:  Benign essential hypertension, Hypokalemia   Changed by:  Latonia Nguyen MD        Dose:  10 mg   Take 1 tablet (10 mg) by mouth daily   Quantity:  90 tablet   Refills:  0       * amLODIPine 5 MG tablet   Commonly known as:  NORVASC   This may have changed:  You were already taking a medication with the same name, and this prescription was added. Make sure you understand how and when to take each.   Used for:  Benign essential hypertension   Changed by:  Latonia Nguyen MD        Take one tab po q d   Quantity:  90 tablet   Refills:  0       * Notice:  This list has 2 medication(s) that are the same as other medications prescribed for you. Read the directions carefully, and ask your doctor or other care provider to review them with you.         Where to get your medicines      These medications were sent to [facility] [address] [address]     Phone:  [phone]     loratadine 10 MG tablet    metoprolol tartrate 100 MG tablet         Some of these will need a paper prescription and others can be bought over the counter.  Ask your nurse if you have questions.     Bring a paper prescription for each of these medications     amLODIPine 5 MG tablet                Primary Care Provider Office Phone # Fax #    Latonia Nguyen [phone] [address]  [facility] [address]        Equal Access to Services     SEA GOLD AH: Luz Maria Villalobos, latia villegas, luis fallon  dana diazshantiiliana collins'aakaro ah. So [facility] [phone].    ATENCIÓN: Si alexandra hayden, tiene a iglesias disposición servicios gratuitos de asistencia lingüística. Trent ayers [phone].    We comply with applicable federal civil rights laws and [place] laws. We do not discriminate on the basis of race, color, national origin, age, disability, sex, sexual orientation, or gender identity.            Thank you!     Thank you for choosing [facility]  for your care. Our goal is always to provide you with excellent care. Hearing back from our patients is one way we can continue to improve our services. Please take a few minutes to complete the written survey that you may receive in the mail after your visit with us. Thank you!             Your Updated Medication List - Protect others around you: Learn how to safely use, store and throw away your medicines at www.disposemymeds.org.          This list is accurate as of 3/16/18  1:47 PM.  Always use your most recent med list.                   Brand Name Dispense Instructions for use Diagnosis    acetaminophen 500 MG tablet    TYLENOL    100 tablet    Take 2 tablets po q 6 hr prn pain    Acute pain of left knee       * amLODIPine 10 MG tablet    NORVASC    90 tablet    Take 1 tablet (10 mg) by mouth daily    Benign essential hypertension, Hypokalemia       * amLODIPine 5 MG tablet    NORVASC    90 tablet    Take one tab po q d    Benign essential hypertension       BENADRYL PO           cholecalciferol 5000 UNITS Caps capsule    vitamin D3    90 capsule    Take 1 capsule (5,000 Units) by mouth daily    Vitamin D deficiency disease       loratadine 10 MG tablet    CLARITIN    90 tablet    Take 1 tablet (10 mg) by mouth daily    Hypokalemia       medroxyPROGESTERone 150 MG/ML injection    DEPO-PROVERA    1 mL    Inject 1 mL (150 mg) into the muscle every 3 months    Contraception       metoprolol tartrate 100 MG tablet    LOPRESSOR    90 tablet    Take 1 tablet  (100 mg) by mouth 2 times daily    Benign essential hypertension       naproxen 500 MG tablet    NAPROSYN    60 tablet    Take 1 tablet (500 mg) by mouth 2 times daily (with meals)    Pain in joint involving ankle and foot, left       potassium chloride SA 10 MEQ CR tablet    K-DUR/KLOR-CON M    20 tablet    Take 1 tablet (10 mEq) by mouth 2 times daily    Hypokalemia       ranitidine 150 MG tablet    ZANTAC    180 tablet    Take 1 tablet (150 mg) by mouth 2 times daily    Gastroesophageal reflux disease without esophagitis       * Notice:  This list has 2 medication(s) that are the same as other medications prescribed for you. Read the directions carefully, and ask your doctor or other care provider to review them with you.

## 2018-03-16 NOTE — PROGRESS NOTES
SUBJECTIVE:   CC: Celine Judd is an 42 year old woman who presents for preventive health visit.     Physical   Annual:     Getting at least 3 servings of Calcium per day::  Yes    Bi-annual eye exam::  NO    Dental care twice a year::  Yes    Sleep apnea or symptoms of sleep apnea::  Excessive snoring    Diet::  Regular (no restrictions)    Frequency of exercise::  1 day/week    Duration of exercise::  15-30 minutes    Taking medications regularly::  Yes    Medication side effects::  None    Additional concerns today::  No          Hypertension Follow-up      Outpatient blood pressures are not being checked. -did just buy a cuff and could have the nurse here do it     /80 to 140-50/88-90-++++    Low Salt Diet: no mponitor  Salt    meds  toprol  100mgm bid since increase 11-17     norvasc 10mgm off since ran out in 6-17     HYPOKALEMIA     -down to 3.0  In 9-16   -on K+ supplement -was to be bid for 5-10 d and has cont'd  -need to determine is is needed     Today's PHQ-2 Score:   PHQ-2 ( 1999 Pf[employer]) 3/16/2018   Q1: Little interest or pleasure in doing things 1   Q2: Feeling down, depressed or hopeless 0   PHQ-2 Score 1   Q1: Little interest or pleasure in doing things Not at all   Q2: Feeling down, depressed or hopeless Several days   PHQ-2 Score 1       Abuse: Current or Past(Physical, Sexual or Emotional)- No  Do you feel safe in your environment - Yes    Social History   Substance Use Topics     Smoking status: Current Every Day Smoker     Packs/day: 0.25     Years: 10.00     Types: Cigarettes     Smokeless tobacco: Never Used     Alcohol use 0.0 oz/week     0 Standard drinks or equivalent per week      Comment: 2d 3x/wk max     No flowsheet data found.    Reviewed orders with patient.  Reviewed health maintenance and updated orders accordingly - Yes  Recent Labs   Lab Test  03/16/18   1400  01/31/17   1142  09/28/16   0908   04/27/15   0949  12/13/13   0834   A1C  5.0  5.3  5.6   < >   --   5.4  "  LDL   --    --    --    --   77  40   HDL   --    --    --    --   71  60   TRIG   --    --    --    --   115  175*   ALT   --    --   36   --    --   26   CR   --   0.84  1.05*   --   1.10*  1.20*   GFRESTIMATED   --   74  57*   --   55*  50*   GFRESTBLACK   --   89  70   --   67  61   POTASSIUM   --   3.9  3.0*   --   3.8  3.7   TSH   --    --   0.82   --    --    --     < > = values in this interval not displayed.        Patient under age 50, mutual decision reflected in health maintenance.      Pertinent mammograms are reviewed under the imaging tab.  History of abnormal Pap smear: NO - age 30- 65 PAP every 3 years recommended    Reviewed and updated as needed this visit by clinical staff  Tobacco  Allergies  Meds  Med Hx  Surg Hx  Fam Hx  Soc Hx        Reviewed and updated as needed this visit by Provider            Review of Systems  C: NEGATIVE for fever, chills, change in weight  I: NEGATIVE for worrisome rashes, moles or lesions  E: NEGATIVE for vision changes or irritation  ENT: NEGATIVE for ear, mouth and throat problems  R: NEGATIVE for significant cough or SOB  B: NEGATIVE for masses, tenderness or discharge  CV: NEGATIVE for chest pain, palpitations or peripheral edema  GI: NEGATIVE for nausea, abdominal pain, heartburn, or change in bowel habits  : NEGATIVE for unusual urinary or vaginal symptoms. Periods are regular.  M: NEGATIVE for significant arthralgias or myalgia  N: NEGATIVE for weakness, dizziness or paresthesias  P: NEGATIVE for changes in mood or affect     OBJECTIVE:   BP (!) 140/100  Pulse 81  Temp 98.5  F (36.9  C) (Tympanic)  Resp 18  Ht 5' 5\" (1.651 m)  Wt 113 lb (51.3 kg)  LMP  (LMP Unknown)  SpO2 98%  Breastfeeding? No  BMI 18.8 kg/m2  Physical Exam  GENERAL: healthy, alert, no distress and under wt   EYES: Eyes grossly normal to inspection, PERRL and conjunctivae and sclerae normal  NECK: no adenopathy, no asymmetry, masses, or scars and thyroid normal to " palpation  RESP: lungs clear to auscultation - no rales, rhonchi or wheezes  BREAST: normal without masses, tenderness or nipple discharge and no palpable axillary masses or adenopathy  CV: regular rate and rhythm, normal S1 S2, no S3 or S4, no murmur, click or rub, no peripheral edema and peripheral pulses strong  ABDOMEN: soft, nontender, no hepatosplenomegaly, no masses and bowel sounds normal  MS: no gross musculoskeletal defects noted, no edema  SKIN: no suspicious lesions or rashes  NEURO: Normal strength and tone, mentation intact and speech normal  PSYCH: mentation appears normal, affect normal/bright  LYMPH: no cervical, supraclavicular, axillary, or inguinal adenopathy    ASSESSMENT/PLAN:       ICD-10-CM    1. Routine general medical examination at a health care facility Z00.00    2. Benign essential hypertension-uncontrolled- issue of increasing meds  I10 metoprolol tartrate (LOPRESSOR) 100 MG tablet     amLODIPine (NORVASC) 5 MG tablet     Basic metabolic panel     OFFICE/OUTPT VISIT,EST,LEVL IV   3. Major depression in complete remission (H) on meds since 4-24-15; onset 2011 F32.5 DEPRESSION ACTION PLAN (DAP)   4. Mild protein-calorie malnutrition (H) E44.1 Basic metabolic panel   5. Hypokalemia E87.6 loratadine (CLARITIN) 10 MG tablet     Basic metabolic panel     OFFICE/OUTPT VISIT,EST,LEVL IV   6. Impaired fasting glucose R73.01 Basic metabolic panel     Hemoglobin A1c   7. Family history of diabetes mellitus Z83.3    8. Hypertriglyceridemia E78.1 Lipid panel reflex to direct LDL Fasting     ALT   9. Chronic allergic rhinitis, unspecified seasonality, unspecified trigger J30.9    10. Screening for malignant neoplasm of cervix Z12.4 Pap imaged thin layer screen with HPV - recommended age 30 - 65 years (select HPV order below)       COUNSELING:  Reviewed preventive health counseling, as reflected in patient instructions       Regular exercise       Healthy diet/nutrition         reports that she has  "been smoking Cigarettes.  She has a 2.50 pack-year smoking history. She has never used smokeless tobacco.    Estimated body mass index is 18.8 kg/(m^2) as calculated from the following:    Height as of this encounter: 5' 5\" (1.651 m).    Weight as of this encounter: 113 lb (51.3 kg).   Weight management plan noted, stable and monitoring     Patient Instructions     Preventive Health Recommendations  Female Ages 40 to 49    Yearly exam:     See your health care provider every year in order to  1. Review health changes.   2. Discuss preventive care.    3. Review your medicines if your doctor prescribed any.      Get a Pap test every three years (unless you have an abnormal result and your provider advises testing more often).      If you get Pap tests with HPV test, you only need to test every 5 years, unless you have an abnormal result. You do not need a Pap test if your uterus was removed (hysterectomy) and you have not had cancer.      You should be tested each year for STDs (sexually transmitted diseases), if you're at risk.       Ask your doctor if you should have a mammogram.      Have a colonoscopy (test for colon cancer) if someone in your family has had colon cancer or polyps before age 50.       Have a cholesterol test every 5 years.       Have a diabetes test (fasting glucose) after age 45. If you are at risk for diabetes, you should have this test every 3 years.    Shots: Get a flu shot each year. Get a tetanus shot every 10 years.     Nutrition:     Eat at least 5 servings of fruits and vegetables each day.    Eat whole-grain bread, whole-wheat pasta and brown rice instead of white grains and rice.    Talk to your provider about Calcium and Vitamin D.     Lifestyle    Exercise at least 150 minutes a week (an average of 30 minutes a day, 5 days a week). This will help you control your weight and prevent disease.    Limit alcohol to one drink per day.    No smoking.     Wear sunscreen to prevent skin " cancer.    See your dentist every six months for an exam and cleaning.    1. Please do your breast exam every mo, when you  Change the  calendar page or set an alarm on your cell phone Do a  visual check for dimples, inversion or indentation or any different position of the nipple Feel manually  for any 1cm or larger  size mass ie about the size of an almond Be sure to cover the entire area of both breasts : this extends back to the back on either side and from the collar bone to the bottom of the breasts where you can begin to feel ribs.    2. Get mammo  At [facility]    3. Your vitamin D is normal.  Please take 1,000 units in the summer and 2,000 units in the winter to maintain it     4. Cont the metoprolol  100mgm  2 x  A d     5. Restart the norvasc at 5 mgm     6. After 3-4 weeks do 3+ BPs and HRs and give them to me #########    7. You need to see an eye doctor --over due     Explained all to pt  I  Explained the treatment and the reason for it       Counseling Resources:  ATP IV Guidelines  Pooled Cohorts Equation Calculator  Breast Cancer Risk Calculator  FRAX Risk Assessment  ICSI Preventive Guidelines  Dietary Guidelines for Americans, 2010  USDA's MyPlate  ASA Prophylaxis  Lung CA Screening    Time spent with the patient 25*mins, more than 50% in counseling and coordinating care, Re above medical problems. Of HTN uncontrolled and low K+   See above plan       Latonia Nguyen MD  [facility]  Answers for HPI/ROS submitted by the patient on 3/16/2018   PHQ-2 Score: 1

## 2018-03-16 NOTE — PATIENT INSTRUCTIONS
Preventive Health Recommendations  Female Ages 40 to 49    Yearly exam:     See your health care provider every year in order to  1. Review health changes.   2. Discuss preventive care.    3. Review your medicines if your doctor prescribed any.      Get a Pap test every three years (unless you have an abnormal result and your provider advises testing more often).      If you get Pap tests with HPV test, you only need to test every 5 years, unless you have an abnormal result. You do not need a Pap test if your uterus was removed (hysterectomy) and you have not had cancer.      You should be tested each year for STDs (sexually transmitted diseases), if you're at risk.       Ask your doctor if you should have a mammogram.      Have a colonoscopy (test for colon cancer) if someone in your family has had colon cancer or polyps before age 50.       Have a cholesterol test every 5 years.       Have a diabetes test (fasting glucose) after age 45. If you are at risk for diabetes, you should have this test every 3 years.    Shots: Get a flu shot each year. Get a tetanus shot every 10 years.     Nutrition:     Eat at least 5 servings of fruits and vegetables each day.    Eat whole-grain bread, whole-wheat pasta and brown rice instead of white grains and rice.    Talk to your provider about Calcium and Vitamin D.     Lifestyle    Exercise at least 150 minutes a week (an average of 30 minutes a day, 5 days a week). This will help you control your weight and prevent disease.    Limit alcohol to one drink per day.    No smoking.     Wear sunscreen to prevent skin cancer.    See your dentist every six months for an exam and cleaning.    1. Please do your breast exam every mo, when you  Change the  calendar page or set an alarm on your cell phone Do a  visual check for dimples, inversion or indentation or any different position of the nipple Feel manually  for any 1cm or larger  size mass ie about the size of an almond Be sure to  cover the entire area of both breasts : this extends back to the back on either side and from the collar bone to the bottom of the breasts where you can begin to feel ribs.    2. Get mammo  At [facility]    3. Your vitamin D is normal.  Please take 1,000 units in the summer and 2,000 units in the winter to maintain it     4. Cont the metoprolol  100mgm  2 x  A d     5. Restart the norvasc at 5 mgm     6. After 3-4 weeks do 3+ BPs and HRs and give them to me #########    7. You need to see an eye doctor --over due

## 2018-03-16 NOTE — LETTER
March 23, 2018      Celinechris Judd  [address]   [facility] [address]        Dear ,    We are writing to inform you of your test results.    All of your lab results are normal, except as noted below.     The following are explanations of some of our lab tests     THIS DOES NOT MEAN THAT YOU HAD ALL OF THESE DONE     Please continue on the same medications unless a change is noted above     These are some general explanations for tests:     Hgb is the blood iron level   WBC means White Blood Cells   Platelets are small blood cells that help with forming the blood clots along with other blood factors.   Electrolytes are Sodium, Potassium, Calcium, Magnesium, Phosphorus.   Liver tests are: AST, ALT, Bilirubin, Alkaline Phosphatase.   Kidney tests are Creatinine, GFR.##function is going lower --still with in age range but need further protection ###   HDL Cholesterol - is the good cholesterol and it is good to have it high.   LDL cholesterol is the bad cholesterol and it is good to have it low.   It is recommended to have LDL less than 130 for people with hypertension and to have it less than 100 for people with heart disease, diabetes and chronic kidney disease.   Triglycerides are another type of lipid and can also cause heart disease so should be kept low. ###needs to treat   Thyroid tests are TSH, T4, T3   Glucose is sugar   A1c is a test that gives us an idea about how well was controlled the diabetes for the last 3 months.   PSA stands for Prostate Specific Antigen and it can be elevated with prostate cancer or prostate inflammation.     Please come in to discuss risks/benefits of further treatment     Resulted Orders   Lipid panel reflex to direct LDL Fasting   Result Value Ref Range    Cholesterol 121 <200 mg/dL    Triglycerides 265 (H) <150 mg/dL      Comment:      Borderline high:  150-199 mg/dl  High:             200-499 mg/dl  Very high:       >499 mg/dl      HDL Cholesterol 42 (L)  >49 mg/dL    LDL Cholesterol Calculated 26 <100 mg/dL      Comment:      Desirable:       <100 mg/dl    Non HDL Cholesterol 79 <130 mg/dL   ALT   Result Value Ref Range    ALT 30 0 - 50 U/L   Basic metabolic panel   Result Value Ref Range    Sodium 139 133 - 144 mmol/L    Potassium 3.7 3.4 - 5.3 mmol/L    Chloride 107 94 - 109 mmol/L    Carbon Dioxide 25 20 - 32 mmol/L    Anion Gap 7 3 - 14 mmol/L    Glucose 84 70 - 99 mg/dL    Urea Nitrogen 12 7 - 30 mg/dL    Creatinine 1.14 (H) 0.52 - 1.04 mg/dL    GFR Estimate 52 (L) >60 mL/min/1.7m2      Comment:      Non  GFR Calc    GFR Estimate If Black 63 >60 mL/min/1.7m2      Comment:       GFR Calc    Calcium 8.7 8.5 - 10.1 mg/dL   Hemoglobin A1c   Result Value Ref Range    Hemoglobin A1C 5.0 4.3 - 6.0 %       If you have any questions or concerns, please call the clinic at the number listed above.       Sincerely,        Latonia Nguyen MD

## 2018-03-16 NOTE — LETTER
My Depression Action Plan  Name: Celine Judd   Date of Birth 1975  Date: 3/16/2018    My doctor: Latonia Nguyen   My clinic: [address] [address]  [phone]          GREEN    ZONE   Good Control    What it looks like:     Things are going generally well. You have normal up s and down s. You may even feel depressed from time to time, but bad moods usually last less than a day.   What you need to do:  1. Continue to care for yourself (see self care plan)  2. Check your depression survival kit and update it as needed  3. Follow your physician s recommendations including any medication.  4. Do not stop taking medication unless you consult with your physician first.           YELLOW         ZONE Getting Worse    What it looks like:     Depression is starting to interfere with your life.     It may be hard to get out of bed; you may be starting to isolate yourself from others.    Symptoms of depression are starting to last most all day and this has happened for several days.     You may have suicidal thoughts but they are not constant.   What you need to do:     1. Call your care team, your response to treatment will improve if you keep your care team informed of your progress. Yellow periods are signs an adjustment may need to be made.     2. Continue your self-care, even if you have to fake it!    3. Talk to someone in your support network    4. Open up your depression survival kit           RED    ZONE Medical Alert - Get Help    What it looks like:     Depression is seriously interfering with your life.     You may experience these or other symptoms: You can t get out of bed most days, can t work or engage in other necessary activities, you have trouble taking care of basic hygiene, or basic responsibilities, thoughts of suicide or death that will not go away, self-injurious behavior.     What  you need to do:  1. Call your care team and request a same-day appointment. If they are not available (weekends or after hours) call your local crisis line, emergency room or 911.            Depression Self Care Plan / Survival Kit    Self-Care for Depression  Here s the deal. Your body and mind are really not as separate as most people think.  What you do and think affects how you feel and how you feel influences what you do and think. This means if you do things that people who feel good do, it will help you feel better.  Sometimes this is all it takes.  There is also a place for medication and therapy depending on how severe your depression is, so be sure to consult with your medical provider and/ or Behavioral Health Consultant if your symptoms are worsening or not improving.     In order to better manage my stress, I will:    Exercise  Get some form of exercise, every day. This will help reduce pain and release endorphins, the  feel good  chemicals in your brain. This is almost as good as taking antidepressants!  This is not the same as joining a gym and then never going! (they count on that by the way ) It can be as simple as just going for a walk or doing some gardening, anything that will get you moving.      Hygiene   Maintain good hygiene (Get out of bed in the morning, Make your bed, Brush your teeth, Take a shower, and Get dressed like you were going to work, even if you are unemployed).  If your clothes don't fit try to get ones that do.    Diet  I will strive to eat foods that are good for me, drink plenty of water, and avoid excessive sugar, caffeine, alcohol, and other mood-altering substances.  Some foods that are helpful in depression are: complex carbohydrates, B vitamins, flaxseed, fish or fish oil, fresh fruits and vegetables.    Psychotherapy  I agree to participate in Individual Therapy (if recommended).    Medication  If prescribed medications, I agree to take them.  Missing doses can result  in serious side effects.  I understand that drinking alcohol, or other illicit drug use, may cause potential side effects.  I will not stop my medication abruptly without first discussing it with my provider.    Staying Connected With Others  I will stay in touch with my friends, family members, and my primary care provider/team.    Use your imagination  Be creative.  We all have a creative side; it doesn t matter if it s oil painting, sand castles, or mud pies! This will also kick up the endorphins.    Witness Beauty  (AKA stop and smell the roses) Take a look outside, even in mid-winter. Notice colors, textures. Watch the squirrels and birds.     Service to others  Be of service to others.  There is always someone else in need.  By helping others we can  get out of ourselves  and remember the really important things.  This also provides opportunities for practicing all the other parts of the program.    Humor  Laugh and be silly!  Adjust your TV habits for less news and crime-drama and more comedy.    Control your stress  Try breathing deep, massage therapy, biofeedback, and meditation. Find time to relax each day.     My support system    Clinic Contact:  Phone number:    Contact 1:  Phone number:    Contact 2:  Phone number:    Oriental orthodox/:  Phone number:    Therapist:  Phone number:    Local crisis center:    Phone number:    Other community support:  Phone number:

## 2018-03-16 NOTE — NURSING NOTE
"Chief Complaint   Patient presents with     Physical     BP (!) 140/100  Pulse 81  Temp 98.5  F (36.9  C) (Tympanic)  Resp 18  Ht 5' 5\" (1.651 m)  Wt 113 lb (51.3 kg)  LMP  (LMP Unknown)  SpO2 98%  Breastfeeding? No  BMI 18.8 kg/m2 Estimated body mass index is 18.8 kg/(m^2) as calculated from the following:    Height as of this encounter: 5' 5\" (1.651 m).    Weight as of this encounter: 113 lb (51.3 kg).  BP completed using cuff size: regular   Martina Archuleta CMA    Health Maintenance Due   Topic Date Due     LIPID MONITORING Q1 YEAR  04/27/2016     EYE EXAM Q1 YEAR  05/14/2016     PAP Q6 MOS DIAGNOSTIC  06/22/2017     Health Maintenance reviewed at today's visit patient asked to schedule/complete:   Cervical Cancer:  Patient agrees to schedule  Diabetes:  Patient agrees to schedule    "

## 2018-03-17 LAB
ALT SERPL W P-5'-P-CCNC: 30 U/L (ref 0–50)
ANION GAP SERPL CALCULATED.3IONS-SCNC: 7 MMOL/L (ref 3–14)
BUN SERPL-MCNC: 12 MG/DL (ref 7–30)
CALCIUM SERPL-MCNC: 8.7 MG/DL (ref 8.5–10.1)
CHLORIDE SERPL-SCNC: 107 MMOL/L (ref 94–109)
CHOLEST SERPL-MCNC: 121 MG/DL
CO2 SERPL-SCNC: 25 MMOL/L (ref 20–32)
CREAT SERPL-MCNC: 1.14 MG/DL (ref 0.52–1.04)
GFR SERPL CREATININE-BSD FRML MDRD: 52 ML/MIN/1.7M2
GLUCOSE SERPL-MCNC: 84 MG/DL (ref 70–99)
HDLC SERPL-MCNC: 42 MG/DL
LDLC SERPL CALC-MCNC: 26 MG/DL
NONHDLC SERPL-MCNC: 79 MG/DL
POTASSIUM SERPL-SCNC: 3.7 MMOL/L (ref 3.4–5.3)
SODIUM SERPL-SCNC: 139 MMOL/L (ref 133–144)
TRIGL SERPL-MCNC: 265 MG/DL

## 2018-03-20 LAB
COPATH REPORT: NORMAL
PAP: NORMAL

## 2018-03-21 LAB
FINAL DIAGNOSIS: ABNORMAL
HPV HR 12 DNA CVX QL NAA+PROBE: POSITIVE
HPV16 DNA SPEC QL NAA+PROBE: NEGATIVE
HPV18 DNA SPEC QL NAA+PROBE: NEGATIVE
SPECIMEN DESCRIPTION: ABNORMAL
SPECIMEN SOURCE CVX/VAG CYTO: ABNORMAL

## 2018-05-17 ENCOUNTER — ALLIED HEALTH/NURSE VISIT (OUTPATIENT)
Dept: NURSING | Facility: CLINIC | Age: 43
End: 2018-05-17
Payer: COMMERCIAL

## 2018-05-17 PROCEDURE — 96372 THER/PROPH/DIAG INJ SC/IM: CPT

## 2018-05-17 PROCEDURE — 99207 ZZC NO CHARGE LOS: CPT

## 2018-05-17 NOTE — MR AVS SNAPSHOT
"              After Visit Summary   2018    Celine Judd    MRN: [id number]           Patient Information     Date Of Birth          1975        Visit Information        Provider Department      2018 10:30 AM BX NURSE [facility]        Today's Diagnoses     Contraception    -  1       Follow-ups after your visit        Who to contact     If you have questions or need follow up information about today's clinic visit or your schedule please contact [facility] directly at [phone].  Normal or non-critical lab and imaging results will be communicated to you by elmenushart, letter or phone within 4 business days after the clinic has received the results. If you do not hear from us within 7 days, please contact the clinic through [email or web address] or phone. If you have a critical or abnormal lab result, we will notify you by phone as soon as possible.  Submit refill requests through ticketscript or call your pharmacy and they will forward the refill request to us. Please allow 3 business days for your refill to be completed.          Additional Information About Your Visit        MyChart Information     ticketscript lets you send messages to your doctor, view your test results, renew your prescriptions, schedule appointments and more. To sign up, go to [email or web address] . Click on \"Log in\" on the left side of the screen, which will take you to the Welcome page. Then click on \"Sign up Now\" on the right side of the page.     You will be asked to enter the access code listed below, as well as some personal information. Please follow the directions to create your username and password.     Your access code is: [email or web address]  Expires: 2018 10:58 AM     Your access code will  in 90 days. If you need help or a new code, please call your [facility] or [phone].        Care EveryWhere ID     This is your Care EveryWhere ID. This could be used by " other organizations to access your [place] medical records  [id number]         Blood Pressure from Last 3 Encounters:   03/16/18 130/80   03/01/18 120/82   11/14/17 136/82    Weight from Last 3 Encounters:   03/16/18 113 lb (51.3 kg)   11/14/17 117 lb (53.1 kg)   11/09/17 112 lb (50.8 kg)              We Performed the Following     THER/PROPH/DIAG INJ, SC/IM        Primary Care Provider Office Phone # Fax #    Latonia Divya Nguyen [phone]       [address] [facility] [address]        Equal Access to Services     SEA GOLD : Hadii virgniie seaman hadasho Soroberto, waaxda luqadaha, qaybta kaalmada adeegyada, luis alcantar . So [facility] [phone].    ATENCIÓN: Si habla español, tiene a iglesias disposición servicios gratuitos de asistencia lingüística. Llame al [phone].    We comply with applicable federal civil rights laws and [place] laws. We do not discriminate on the basis of race, color, national origin, age, disability, sex, sexual orientation, or gender identity.            Thank you!     [facility]. Our goal is always to provide you with excellent care. Hearing back from our patients is one way we can continue to improve our services. Please take a few minutes to complete the written survey that you may receive in the mail after your visit with us. Thank you!             Your Updated Medication List - Protect others around you: Learn how to safely use, store and throw away your medicines at www.disposemymeds.org.          This list is accurate as of 5/17/18 11:59 PM.  Always use your most recent med list.                   Brand Name Dispense Instructions for use Diagnosis    acetaminophen 500 MG tablet    TYLENOL    100 tablet    Take 2 tablets po q 6 hr prn pain    Acute pain of left knee       * amLODIPine 10 MG tablet    NORVASC    90 tablet    Take 1 tablet (10 mg) by mouth daily     Benign essential hypertension, Hypokalemia       * amLODIPine 5 MG tablet    NORVASC    90 tablet    Take one tab po q d    Benign essential hypertension       BENADRYL PO           cholecalciferol 5000 units Caps capsule    vitamin D3    90 capsule    Take 1 capsule (5,000 Units) by mouth daily    Vitamin D deficiency disease       loratadine 10 MG tablet    CLARITIN    90 tablet    Take 1 tablet (10 mg) by mouth daily    Hypokalemia       medroxyPROGESTERone 150 MG/ML injection    DEPO-PROVERA    1 mL    Inject 1 mL (150 mg) into the muscle every 3 months    Contraception       metoprolol tartrate 100 MG tablet    LOPRESSOR    90 tablet    Take 1 tablet (100 mg) by mouth 2 times daily    Benign essential hypertension       naproxen 500 MG tablet    NAPROSYN    60 tablet    Take 1 tablet (500 mg) by mouth 2 times daily (with meals)    Pain in joint involving ankle and foot, left       potassium chloride SA 10 MEQ CR tablet    K-DUR/KLOR-CON M    20 tablet    Take 1 tablet (10 mEq) by mouth 2 times daily    Hypokalemia       ranitidine 150 MG tablet    ZANTAC    180 tablet    Take 1 tablet (150 mg) by mouth 2 times daily    Gastroesophageal reflux disease without esophagitis       * Notice:  This list has 2 medication(s) that are the same as other medications prescribed for you. Read the directions carefully, and ask your doctor or other care provider to review them with you.

## 2018-05-18 NOTE — PROGRESS NOTES
Follow Up Injection    Patient returning during stated date range given at previous visit: Yes      If here at the correct interval:   BP Readings from Last 1 Encounters:   03/16/18 130/80     Wt Readings from Last 1 Encounters:   03/16/18 113 lb (51.3 kg)       Last Pap/exam date:       Side effects or problems with last injection?  No.  Date range is given to patient for next dose: 08/02-08/16/18    See Medication Note for administration information    Staff Sig: Martina Archuleta CMA

## 2018-07-12 DIAGNOSIS — E87.6 HYPOKALEMIA: Primary | ICD-10-CM

## 2018-07-12 DIAGNOSIS — R53.81 MALAISE: ICD-10-CM

## 2018-07-12 PROCEDURE — 84132 ASSAY OF SERUM POTASSIUM: CPT | Performed by: FAMILY MEDICINE

## 2018-07-12 PROCEDURE — 36415 COLL VENOUS BLD VENIPUNCTURE: CPT | Performed by: FAMILY MEDICINE

## 2018-07-13 LAB — POTASSIUM SERPL-SCNC: 4 MMOL/L (ref 3.4–5.3)

## 2018-07-25 ASSESSMENT — PATIENT HEALTH QUESTIONNAIRE - PHQ9: SUM OF ALL RESPONSES TO PHQ QUESTIONS 1-9: 3

## 2018-08-08 ENCOUNTER — ALLIED HEALTH/NURSE VISIT (OUTPATIENT)
Dept: NURSING | Facility: CLINIC | Age: 43
End: 2018-08-08
Payer: COMMERCIAL

## 2018-08-08 PROCEDURE — 99207 ZZC NO CHARGE LOS: CPT

## 2018-08-08 PROCEDURE — 96372 THER/PROPH/DIAG INJ SC/IM: CPT

## 2018-08-08 NOTE — MR AVS SNAPSHOT
After Visit Summary   8/8/2018    Celine Judd    MRN: [id number]           Patient Information     Date Of Birth          1975        Visit Information        Provider Department      8/8/2018 2:00 PM BX NURSE [facility]        Today's Diagnoses     Contraception    -  1       Follow-ups after your visit        Your next 10 appointments already scheduled     Aug 08, 2018  2:00 PM CDT   Nurse Only with BX NURSE   [facility] ([facility])    [address] [place] [address] [id number] [phone]              Who to contact     If you have questions or need follow up information about today's clinic visit or your schedule please contact [facility] directly at [phone].  Normal or non-critical lab and imaging results will be communicated to you by MyChart, letter or phone within 4 business days after the clinic has received the results. If you do not hear from us within 7 days, please contact the clinic through MyChart or phone. If you have a critical or abnormal lab result, we will notify you by phone as soon as possible.  Submit refill requests through Gonway or call your pharmacy and they will forward the refill request to us. Please allow 3 business days for your refill to be completed.          Additional Information About Your Visit        Care EveryWhere ID     This is your Care EveryWhere ID. This could be used by other organizations to access your [place] medical records  [id number]         Blood Pressure from Last 3 Encounters:   03/16/18 130/80   03/01/18 120/82   11/14/17 136/82    Weight from Last 3 Encounters:   03/16/18 113 lb (51.3 kg)   11/14/17 117 lb (53.1 kg)   11/09/17 112 lb (50.8 kg)              We Performed the Following     THER/PROPH/DIAG INJ, SC/IM        Primary Care Provider Office Phone # Fax #    Latonia Rhodes  MD Patrick [phone]       [address] [facility] [address]        Equal Access to Services     SEA GOLD : Hadii aad ku hadgregmakenzie Reinaroberto, wasierrada maishakyeha, qaamauryta karogelioda josé, luis zeferinoin hayaakaro taylornatalie heard ortiz gloria. So [facility] [phone].    ATENCIÓN: Si habla español, tiene a iglesias disposición servicios gratuitos de asistencia lingüística. Llame al [phone].    We comply with applicable federal civil rights laws and [place] laws. We do not discriminate on the basis of race, color, national origin, age, disability, sex, sexual orientation, or gender identity.            Thank you!     Thank you for choosing [facility]  for your care. Our goal is always to provide you with excellent care. Hearing back from our patients is one way we can continue to improve our services. Please take a few minutes to complete the written survey that you may receive in the mail after your visit with us. Thank you!             Your Updated Medication List - Protect others around you: Learn how to safely use, store and throw away your medicines at www.disposemymeds.org.          This list is accurate as of 8/8/18  1:43 PM.  Always use your most recent med list.                   Brand Name Dispense Instructions for use Diagnosis    acetaminophen 500 MG tablet    TYLENOL    100 tablet    Take 2 tablets po q 6 hr prn pain    Acute pain of left knee       * amLODIPine 10 MG tablet    NORVASC    90 tablet    Take 1 tablet (10 mg) by mouth daily    Benign essential hypertension, Hypokalemia       * amLODIPine 5 MG tablet    NORVASC    90 tablet    Take one tab po q d    Benign essential hypertension       BENADRYL PO           cholecalciferol 5000 units Caps capsule    vitamin D3    90 capsule    Take 1 capsule (5,000 Units) by mouth daily    Vitamin D deficiency disease       loratadine 10 MG tablet    CLARITIN    90 tablet    Take 1 tablet (10 mg) by mouth daily     Hypokalemia       medroxyPROGESTERone 150 MG/ML injection    DEPO-PROVERA    1 mL    Inject 1 mL (150 mg) into the muscle every 3 months    Contraception       metoprolol tartrate 100 MG tablet    LOPRESSOR    90 tablet    Take 1 tablet (100 mg) by mouth 2 times daily    Benign essential hypertension       naproxen 500 MG tablet    NAPROSYN    60 tablet    Take 1 tablet (500 mg) by mouth 2 times daily (with meals)    Pain in joint involving ankle and foot, left       potassium chloride SA 10 MEQ CR tablet    K-DUR/KLOR-CON M    20 tablet    Take 1 tablet (10 mEq) by mouth 2 times daily    Hypokalemia       ranitidine 150 MG tablet    ZANTAC    180 tablet    Take 1 tablet (150 mg) by mouth 2 times daily    Gastroesophageal reflux disease without esophagitis       * Notice:  This list has 2 medication(s) that are the same as other medications prescribed for you. Read the directions carefully, and ask your doctor or other care provider to review them with you.

## 2018-08-08 NOTE — PROGRESS NOTES
Follow Up Injection    Patient returning during stated date range given at previous visit: Yes      If here at the correct interval:   BP Readings from Last 1 Encounters:   03/16/18 130/80     Wt Readings from Last 1 Encounters:   03/16/18 113 lb (51.3 kg)       Last Pap/exam date:       Side effects or problems with last injection?  No.  Date range is given to patient for next dose: 10/24/18-11/07/18    See Medication Note for administration information    Staff Sig: Martina Archuleta CMA

## 2018-09-06 ENCOUNTER — OFFICE VISIT (OUTPATIENT)
Dept: FAMILY MEDICINE | Facility: CLINIC | Age: 43
End: 2018-09-06
Payer: COMMERCIAL

## 2018-09-06 VITALS
RESPIRATION RATE: 18 BRPM | HEIGHT: 65 IN | BODY MASS INDEX: 18.83 KG/M2 | HEART RATE: 92 BPM | TEMPERATURE: 98.2 F | DIASTOLIC BLOOD PRESSURE: 84 MMHG | SYSTOLIC BLOOD PRESSURE: 138 MMHG | WEIGHT: 113 LBS | OXYGEN SATURATION: 99 %

## 2018-09-06 DIAGNOSIS — L72.3 SEBACEOUS CYST: Primary | ICD-10-CM

## 2018-09-06 DIAGNOSIS — I10 BENIGN ESSENTIAL HYPERTENSION: ICD-10-CM

## 2018-09-06 DIAGNOSIS — F32.5 MAJOR DEPRESSION IN COMPLETE REMISSION (H): ICD-10-CM

## 2018-09-06 PROCEDURE — 99213 OFFICE O/P EST LOW 20 MIN: CPT | Mod: 25 | Performed by: FAMILY MEDICINE

## 2018-09-06 PROCEDURE — 10060 I&D ABSCESS SIMPLE/SINGLE: CPT | Performed by: FAMILY MEDICINE

## 2018-09-06 ASSESSMENT — ANXIETY QUESTIONNAIRES
3. WORRYING TOO MUCH ABOUT DIFFERENT THINGS: MORE THAN HALF THE DAYS
IF YOU CHECKED OFF ANY PROBLEMS ON THIS QUESTIONNAIRE, HOW DIFFICULT HAVE THESE PROBLEMS MADE IT FOR YOU TO DO YOUR WORK, TAKE CARE OF THINGS AT HOME, OR GET ALONG WITH OTHER PEOPLE: SOMEWHAT DIFFICULT
7. FEELING AFRAID AS IF SOMETHING AWFUL MIGHT HAPPEN: SEVERAL DAYS
6. BECOMING EASILY ANNOYED OR IRRITABLE: SEVERAL DAYS
2. NOT BEING ABLE TO STOP OR CONTROL WORRYING: MORE THAN HALF THE DAYS
5. BEING SO RESTLESS THAT IT IS HARD TO SIT STILL: SEVERAL DAYS
GAD7 TOTAL SCORE: 9
1. FEELING NERVOUS, ANXIOUS, OR ON EDGE: SEVERAL DAYS

## 2018-09-06 ASSESSMENT — PATIENT HEALTH QUESTIONNAIRE - PHQ9: 5. POOR APPETITE OR OVEREATING: SEVERAL DAYS

## 2018-09-06 NOTE — PROGRESS NOTES
SUBJECTIVE:   Celine Judd is a 43 year old female who presents to clinic today for the following health issues:    INFECTED SEBACEOUS CYST       Duration: x 2 days    Description (location/character/radiation): Cyst Lt post medial upper back     Intensity:  mild    Accompanying signs and symptoms: Inflamed and Pain to the touch    History (similar episodes/previous evaluation): None    Precipitating or alleviating factors: None    Therapies tried and outcome: None     Hypertension Follow-up      Outpatient blood pressures are not being checked.    Here > 140/95    Is in pain from the cyst     Med: toprol 50mgm bid, norvasc 10mgm     HR also hi :      Low Salt Diet: no added salt    Glucose Intolerance Follow-up      Patient is checking blood sugars: not at all     Hi FBS s     Diabetic concerns: None     Symptoms of hypoglycemia (low blood sugar): none     Paresthesias (numbness or burning in feet) or sores: No     Date of last diabetic eye exam: 2016    fam hx DM     Depression and Anxiety Follow-Up      Status since last visit: Worsened with mom's recet Dxes     Other associated symptoms:None    Complicating factors:     Significant life event: No     Current substance abuse: None    Meds: none     PHQ-9 Score and MyChart F/U Questions 6/14/2016 7/13/2016 6/23/2017   Total Score 3 1 0   Q9: Suicide Ideation Not at all Not at all Not at all     No flowsheet data found.    PHQ-9  English=4  PHQ-9   Any Language  GAD7=8---anxious about the cyst as going to [place] to see son in 2 days   Suicide Assessment Five-step Evaluation and Treatment (SAFE-T)          Amount of exercise or physical activity: None    Problems taking medications regularly: No    Medication side effects: none    Diet: regular (no restrictions)        Problem list and histories reviewed & adjusted, as indicated.  Additional history: as documented    Labs reviewed in EPIC    Reviewed and updated as needed this visit by clinical  "staff  Tobacco  Allergies  Meds  Problems  Med Hx  Surg Hx  Fam Hx  Soc Hx        Reviewed and updated as needed this visit by Provider  Allergies  Meds  Problems         ROS:  CONSTITUTIONAL: NEGATIVE for fever, chills, change in weight  INTEGUMENTARY/SKIN: NEGATIVE for worrisome rashes, moles or lesions  POS  Tender red bump on back  EYES: NEGATIVE for vision changes or irritation  ENT/MOUTH: NEGATIVE for ear, mouth and throat problems  RESP: NEGATIVE for significant cough or SOB  BREAST: NEGATIVE for masses, tenderness or discharge  CV: NEGATIVE for chest pain, palpitations or peripheral edema  GI: NEGATIVE for nausea, abdominal pain, heartburn, or change in bowel habits  : NEGATIVE for frequency, dysuria, or hematuria  MUSCULOSKELETAL: NEGATIVE for significant arthralgias or myalgia  NEURO: NEGATIVE for weakness, dizziness or paresthesias  ENDOCRINE: NEGATIVE for temperature intolerance, skin/hair changes  HEME: NEGATIVE for bleeding problems  PSYCHIATRIC: NEGATIVE for changes in mood or affect    OBJECTIVE:     /84  Pulse 92  Temp 98.2  F (36.8  C) (Tympanic)  Resp 18  Ht 5' 5\" (1.651 m)  Wt 113 lb (51.3 kg)  LMP  (LMP Unknown)  SpO2 99%  Breastfeeding? No  BMI 18.8 kg/m2  Body mass index is 18.8 kg/(m^2).  GENERAL: healthy, alert and no distress  EYES: Eyes grossly normal to inspection, PERRL and conjunctivae and sclerae normal  RESP: lungs clear to auscultation - no rales, rhonchi or wheezes  CV: regular rate and rhythm, normal S1 S2, no S3 or S4, no murmur, click or rub, no peripheral edema and peripheral pulses strong  MS: no gross musculoskeletal defects noted, no edema  SKIN: no suspicious lesions or rashes  POS  1.5 cm diam yellow round swelling with periph skin red x 2-3 mm -tender   NEURO: Normal strength and tone, mentation intact and speech normal  PSYCH: mentation appears normal, affect normal/bright, anxious and appearance well groomed    Diagnostic Test Results:  Results " "for orders placed or performed in visit on 07/12/18   Potassium   Result Value Ref Range    Potassium 4.0 3.4 - 5.3 mmol/L     PROCEDURE     -1% xyloc with epi post scrubbing   -direct incision with #11 blade--> small amt of serosanguinous + pus   -cleansed with scraping   \-packed with the corner of a 2\" gauze     ASSESSMENT/PLAN:               ICD-10-CM    1. Sebaceous cyst-infected--I&D done 9-6-18a L72.3    2. Major depression in complete remission (H) on meds since 4-24-15; onset 2011 F32.5    3. Benign essential hypertension-high today w pain of cyst  I10        Patient Instructions   1 Your vitamin D is normal.  Please take 1,000 units in the summer and 2,000 units in the winter to maintain it       2. Warm soaks q d and press dry   Reinsert the gauze   May need total excision in future     Latonia Nguyen MD  [facility]    "

## 2018-09-06 NOTE — PATIENT INSTRUCTIONS
1 Your vitamin D is normal.  Please take 1,000 units in the summer and 2,000 units in the winter to maintain it

## 2018-09-06 NOTE — MR AVS SNAPSHOT
"              After Visit Summary   9/6/2018    Celine Judd    MRN: [id number]           Patient Information     Date Of Birth          1975        Visit Information        Provider Department      9/6/2018 1:00 PM Latonia Nguyen MD [facility]        Today's Diagnoses     Screening for malignant neoplasm of cervix    -  1      Care Instructions    1 Your vitamin D is normal.  Please take 1,000 units in the summer and 2,000 units in the winter to maintain it               Follow-ups after your visit        Who to contact     If you have questions or need follow up information about today's clinic visit or your schedule please contact [facility] directly at [phone].  Normal or non-critical lab and imaging results will be communicated to you by MyChart, letter or phone within 4 business days after the clinic has received the results. If you do not hear from us within 7 days, please contact the clinic through MyChart or phone. If you have a critical or abnormal lab result, we will notify you by phone as soon as possible.  Submit refill requests through [employer] or call your pharmacy and they will forward the refill request to us. Please allow 3 business days for your refill to be completed.          Additional Information About Your Visit        Care EveryWhere ID     This is your Care EveryWhere ID. This could be used by other organizations to access your [place] medical records  [id number]        Your Vitals Were     Pulse Temperature Respirations Height Last Period Pulse Oximetry    92 98.2  F (36.8  C) (Tympanic) 18 5' 5\" (1.651 m) (LMP Unknown) 99%    Breastfeeding? BMI (Body Mass Index)                No 18.8 kg/m2           Blood Pressure from Last 3 Encounters:   09/06/18 138/84   03/16/18 130/80   03/01/18 120/82    Weight from Last 3 Encounters:   09/06/18 113 lb (51.3 kg)   03/16/18 113 lb (51.3 kg)   11/14/17 117 lb " (53.1 kg)              Today, you had the following     No orders found for display       Primary Care Provider Office Phone # Fax #    Latonia Nguyen [phone]       [address] [facility] [address]        Equal Access to Services     SEA GOLD : Hadii aad ku hadasho Soomaali, waaxda luqadaha, qaybta kaalmada adeegyada, waxay zeferinoin haycristobaln dana khshantiiliana lagonzalo . So [facility] [phone].    ATENCIÓN: Si habla español, tiene a iglesias disposición servicios gratuitos de asistencia lingüística. [place] al [phone].    We comply with applicable federal civil rights laws and [place] laws. We do not discriminate on the basis of race, color, national origin, age, disability, sex, sexual orientation, or gender identity.            Thank you!     Thank you for choosing [facility]  for your care. Our goal is always to provide you with excellent care. Hearing back from our patients is one way we can continue to improve our services. Please take a few minutes to complete the written survey that you may receive in the mail after your visit with us. Thank you!             Your Updated Medication List - Protect others around you: Learn how to safely use, store and throw away your medicines at www.disposemymeds.org.          This list is accurate as of 9/6/18  5:23 PM.  Always use your most recent med list.                   Brand Name Dispense Instructions for use Diagnosis    acetaminophen 500 MG tablet    TYLENOL    100 tablet    Take 2 tablets po q 6 hr prn pain    Acute pain of left knee       amLODIPine 5 MG tablet    NORVASC    90 tablet    Take one tab po q d    Benign essential hypertension       BENADRYL PO           cholecalciferol 5000 units Caps capsule    vitamin D3    90 capsule    Take 1 capsule (5,000 Units) by mouth daily    Vitamin D deficiency disease       loratadine 10 MG tablet    CLARITIN    90 tablet    Take 1 tablet (10 mg) by mouth daily     Hypokalemia       medroxyPROGESTERone 150 MG/ML injection    DEPO-PROVERA    1 mL    Inject 1 mL (150 mg) into the muscle every 3 months    Contraception       metoprolol tartrate 100 MG tablet    LOPRESSOR    90 tablet    Take 1 tablet (100 mg) by mouth 2 times daily    Benign essential hypertension       naproxen 500 MG tablet    NAPROSYN    60 tablet    Take 1 tablet (500 mg) by mouth 2 times daily (with meals)    Pain in joint involving ankle and foot, left       potassium chloride SA 10 MEQ CR tablet    K-DUR/KLOR-CON M    20 tablet    Take 1 tablet (10 mEq) by mouth 2 times daily    Hypokalemia       ranitidine 150 MG tablet    ZANTAC    180 tablet    Take 1 tablet (150 mg) by mouth 2 times daily    Gastroesophageal reflux disease without esophagitis

## 2018-09-07 ENCOUNTER — OFFICE VISIT (OUTPATIENT)
Dept: FAMILY MEDICINE | Facility: CLINIC | Age: 43
End: 2018-09-07
Payer: COMMERCIAL

## 2018-09-07 VITALS
TEMPERATURE: 99 F | WEIGHT: 113 LBS | OXYGEN SATURATION: 99 % | SYSTOLIC BLOOD PRESSURE: 124 MMHG | BODY MASS INDEX: 18.83 KG/M2 | HEIGHT: 65 IN | RESPIRATION RATE: 18 BRPM | DIASTOLIC BLOOD PRESSURE: 96 MMHG | HEART RATE: 92 BPM

## 2018-09-07 DIAGNOSIS — L72.3 SEBACEOUS CYST: Primary | ICD-10-CM

## 2018-09-07 PROCEDURE — 99024 POSTOP FOLLOW-UP VISIT: CPT | Performed by: FAMILY MEDICINE

## 2018-09-07 ASSESSMENT — PATIENT HEALTH QUESTIONNAIRE - PHQ9: SUM OF ALL RESPONSES TO PHQ QUESTIONS 1-9: 4

## 2018-09-07 ASSESSMENT — ANXIETY QUESTIONNAIRES: GAD7 TOTAL SCORE: 9

## 2018-09-07 NOTE — MR AVS SNAPSHOT
After Visit Summary   9/7/2018    Celine Judd    MRN: [id number]           Patient Information     Date Of Birth          1975        Visit Information        Provider Department      9/7/2018 2:20 PM Latonia Nguyen MD [facility]        Care Instructions    At least once a day, pour warm water into it . Then press dry, clean/dry out with a Q tip and , use the plain end of the Q tip to push a corner of the gauze into the wound  Do not pack hard or heavily . Just put the gauze in the wound .  Use rest of shireen t gauze to tape over the top and secure the wound           Follow-ups after your visit        Follow-up notes from your care team     Return in about 6 days (around 9/13/2018) for wound check .      Who to contact     If you have questions or need follow up information about today's clinic visit or your schedule please contact [facility] directly at [phone].  Normal or non-critical lab and imaging results will be communicated to you by MyChart, letter or phone within 4 business days after the clinic has received the results. If you do not hear from us within 7 days, please contact the clinic through MyChart or phone. If you have a critical or abnormal lab result, we will notify you by phone as soon as possible.  Submit refill requests through [employer] or call your pharmacy and they will forward the refill request to us. Please allow 3 business days for your refill to be completed.          Additional Information About Your Visit        Care EveryWhere ID     This is your Care EveryWhere ID. This could be used by other organizations to access your [place] medical records  [id number]        Your Vitals Were     Last Period                   (LMP Unknown)            Blood Pressure from Last 3 Encounters:   09/06/18 138/84   03/16/18 130/80   03/01/18 120/82    Weight from Last 3 Encounters:   09/06/18 113 lb  (51.3 kg)   03/16/18 113 lb (51.3 kg)   11/14/17 117 lb (53.1 kg)              Today, you had the following     No orders found for display       Primary Care Provider Office Phone # Fax #    Latonia Divya Nguyen [phone] [address] [facility] [address]        Equal Access to Services     ALLEN GOLD : Hadii aad ku hadasho Soomaali, waaxda luqadaha, qaybta kaalmada adeegyada, waxay idiin hayaan adeeg kharash la'aan ah. So [facility] [phone].    ATENCIÓN: Si alexandra hayden, tiene a iglesias disposición servicios gratuitos de asistencia lingüística. Llame al [phone].    We comply with applicable federal civil rights laws and [place] laws. We do not discriminate on the basis of race, color, national origin, age, disability, sex, sexual orientation, or gender identity.            Thank you!     Thank you for choosing [facility]  for your care. Our goal is always to provide you with excellent care. Hearing back from our patients is one way we can continue to improve our services. Please take a few minutes to complete the written survey that you may receive in the mail after your visit with us. Thank you!             Your Updated Medication List - Protect others around you: Learn how to safely use, store and throw away your medicines at www.disposemymeds.org.          This list is accurate as of 9/7/18  2:41 PM.  Always use your most recent med list.                   Brand Name Dispense Instructions for use Diagnosis    acetaminophen 500 MG tablet    TYLENOL    100 tablet    Take 2 tablets po q 6 hr prn pain    Acute pain of left knee       amLODIPine 5 MG tablet    NORVASC    90 tablet    Take one tab po q d    Benign essential hypertension       BENADRYL PO           cholecalciferol 5000 units Caps capsule    vitamin D3    90 capsule    Take 1 capsule (5,000 Units) by mouth daily    Vitamin D deficiency disease       loratadine 10 MG tablet     CLARITIN    90 tablet    Take 1 tablet (10 mg) by mouth daily    Hypokalemia       medroxyPROGESTERone 150 MG/ML injection    DEPO-PROVERA    1 mL    Inject 1 mL (150 mg) into the muscle every 3 months    Contraception       metoprolol tartrate 100 MG tablet    LOPRESSOR    90 tablet    Take 1 tablet (100 mg) by mouth 2 times daily    Benign essential hypertension       naproxen 500 MG tablet    NAPROSYN    60 tablet    Take 1 tablet (500 mg) by mouth 2 times daily (with meals)    Pain in joint involving ankle and foot, left       potassium chloride SA 10 MEQ CR tablet    K-DUR/KLOR-CON M    20 tablet    Take 1 tablet (10 mEq) by mouth 2 times daily    Hypokalemia       ranitidine 150 MG tablet    ZANTAC    180 tablet    Take 1 tablet (150 mg) by mouth 2 times daily    Gastroesophageal reflux disease without esophagitis

## 2018-09-07 NOTE — NURSING NOTE
"Chief Complaint   Patient presents with     RECHECK     BP (!) 126/94  Pulse 92  Temp 99  F (37.2  C) (Tympanic)  Resp 18  Ht 5' 5\" (1.651 m)  Wt 113 lb (51.3 kg)  LMP  (LMP Unknown)  SpO2 99%  Breastfeeding? No  BMI 18.8 kg/m2 Estimated body mass index is 18.8 kg/(m^2) as calculated from the following:    Height as of this encounter: 5' 5\" (1.651 m).    Weight as of this encounter: 113 lb (51.3 kg).  BP completed using cuff size: regular   Martina Archuleta CMA    Health Maintenance Due   Topic Date Due     EYE EXAM Q1 YEAR  05/14/2016     INFLUENZA VACCINE (1) 09/01/2018     PAP Q6 MOS DIAGNOSTIC  09/16/2018     Health Maintenance reviewed at today's visit patient asked to schedule/complete:   Diabetes:  Patient agrees to schedule  Immunizations:  Patient agrees to schedule    "

## 2018-09-07 NOTE — PATIENT INSTRUCTIONS
At least once a day, pour warm water into it . Then press dry, clean/dry out with a Q tip and , use the plain end of the Q tip to push a corner of the gauze into the wound  Do not pack hard or heavily . Just put the gauze in the wound .  Use rest of shireen t gauze to tape over the top and secure the wound

## 2018-09-07 NOTE — PROGRESS NOTES
SUBJECTIVE:   Celine Judd is a 43 year old female who presents to clinic today for the following health issues:    Infected Sebaceous Cyst       Duration: x 2 days    I&D 9-6-18    Description (location/character/radiation): Cyst    Intensity:  mild    Accompanying signs and symptoms: Inflamed and Pain to the touch    History (similar episodes/previous evaluation): None    Precipitating or alleviating factors: None    Therapies tried and outcome: q d  Irrigate and packing      Hypertension Follow-up      Outpatient blood pressures are not being checked.    Here > 160/100 to 180/110 as is in pain today     Med: toprol 50mgm bid, norvasc 10mgm     HR also hi :      Low Salt Diet: no added salt            Amount of exercise or physical activity: None    Problems taking medications regularly: No    Medication side effects: none    Diet: regular (no restrictions)      Problem list and histories reviewed & adjusted, as indicated.  Additional history: as documented    Labs reviewed in EPIC    Reviewed and updated as needed this visit by clinical staff  Tobacco  Allergies  Meds  Med Hx  Surg Hx  Fam Hx  Soc Hx      Reviewed and updated as needed this visit by Provider         ROS:  CONSTITUTIONAL: NEGATIVE for fever, chills, change in weight  INTEGUMENTARY/SKIN: NEGATIVE for worrisome rashes, moles or lesions POS open I&D on upper back   EYES: NEGATIVE for vision changes or irritation  ENT/MOUTH: NEGATIVE for ear, mouth and throat problems  RESP: NEGATIVE for significant cough or SOB  BREAST: NEGATIVE for masses, tenderness or discharge  CV: NEGATIVE for chest pain, palpitations or peripheral edema  GI: NEGATIVE for nausea, abdominal pain, heartburn, or change in bowel habits  : NEGATIVE for frequency, dysuria, or hematuria  MUSCULOSKELETAL: NEGATIVE for significant arthralgias or myalgia  NEURO: NEGATIVE for weakness, dizziness or paresthesias  ENDOCRINE: NEGATIVE for temperature intolerance,  "skin/hair changes  HEME: NEGATIVE for bleeding problems  PSYCHIATRIC: NEGATIVE for changes in mood or affect    OBJECTIVE:     BP (!) 124/96  Pulse 92  Temp 99  F (37.2  C) (Tympanic)  Resp 18  Ht 5' 5\" (1.651 m)  Wt 113 lb (51.3 kg)  LMP  (LMP Unknown)  SpO2 99%  Breastfeeding? No  BMI 18.8 kg/m2  Body mass index is 18.8 kg/(m^2).  GENERAL: healthy, alert and no distress  EYES: Eyes grossly normal to inspection, PERRL and conjunctivae and sclerae normal  RESP: lungs clear to auscultation - no rales, rhonchi or wheezes  MS: no gross musculoskeletal defects noted, no edema  SKIN: no suspicious lesions or rashes  POS open 1.1 cm I&D on Rt upper back with small amt clear serous drainage on gauze packing from 24 hrs ago; probes to 8mm   NEURO: Normal strength and tone, mentation intact and speech normal  PSYCH: mentation appears normal, affect normal/bright    Diagnostic Test Results:  Results for orders placed or performed in visit on 07/12/18   Potassium   Result Value Ref Range    Potassium 4.0 3.4 - 5.3 mmol/L       ASSESSMENT/PLAN:               Patient Instructions   At least once a day, pour warm water into it . Then press dry, clean/dry out with a Q tip and , use the plain end of the Q tip to push a corner of the gauze into the wound  Do not pack hard or heavily . Just put the gauze in the wound .  Use rest of shireen t gauze to tape over the top and secure the wound     I  Explained the treatment and the reason for it   As her son will be doing it on her trip to see him in [place] for 5 d   rtc here after     Latonia Nguyen MD  [facility]    "

## 2018-09-08 PROBLEM — L72.3 SEBACEOUS CYST: Status: ACTIVE | Noted: 2018-09-08

## 2018-09-27 DIAGNOSIS — K21.9 GASTROESOPHAGEAL REFLUX DISEASE WITHOUT ESOPHAGITIS: ICD-10-CM

## 2018-09-27 DIAGNOSIS — I10 BENIGN ESSENTIAL HYPERTENSION: ICD-10-CM

## 2018-09-27 DIAGNOSIS — E87.6 HYPOKALEMIA: ICD-10-CM

## 2018-09-28 RX ORDER — LORATADINE 10 MG/1
10 TABLET ORAL DAILY
Qty: 90 TABLET | Refills: 1 | Status: SHIPPED | OUTPATIENT
Start: 2018-09-28 | End: 2022-08-01

## 2018-09-28 RX ORDER — AMLODIPINE BESYLATE 5 MG/1
TABLET ORAL
Qty: 90 TABLET | Refills: 1 | Status: SHIPPED | OUTPATIENT
Start: 2018-09-28 | End: 2019-05-24

## 2018-09-28 RX ORDER — METOPROLOL TARTRATE 100 MG
100 TABLET ORAL 2 TIMES DAILY
Qty: 90 TABLET | Refills: 1 | Status: SHIPPED | OUTPATIENT
Start: 2018-09-28 | End: 2020-07-02

## 2018-10-25 ENCOUNTER — OFFICE VISIT (OUTPATIENT)
Dept: FAMILY MEDICINE | Facility: CLINIC | Age: 43
End: 2018-10-25
Payer: COMMERCIAL

## 2018-10-25 VITALS
HEIGHT: 65 IN | RESPIRATION RATE: 14 BRPM | SYSTOLIC BLOOD PRESSURE: 130 MMHG | WEIGHT: 111 LBS | DIASTOLIC BLOOD PRESSURE: 90 MMHG | HEART RATE: 90 BPM | OXYGEN SATURATION: 98 % | TEMPERATURE: 98.7 F | BODY MASS INDEX: 18.49 KG/M2

## 2018-10-25 DIAGNOSIS — Z23 NEED FOR PROPHYLACTIC VACCINATION AND INOCULATION AGAINST INFLUENZA: ICD-10-CM

## 2018-10-25 DIAGNOSIS — F17.200 TOBACCO USE DISORDER: ICD-10-CM

## 2018-10-25 DIAGNOSIS — F41.9 ANXIETY: ICD-10-CM

## 2018-10-25 DIAGNOSIS — Z30.42 DEPO-PROVERA CONTRACEPTIVE STATUS: ICD-10-CM

## 2018-10-25 DIAGNOSIS — I10 BENIGN ESSENTIAL HYPERTENSION: Primary | ICD-10-CM

## 2018-10-25 DIAGNOSIS — F32.5 MAJOR DEPRESSION IN COMPLETE REMISSION (H): ICD-10-CM

## 2018-10-25 PROCEDURE — 96372 THER/PROPH/DIAG INJ SC/IM: CPT | Performed by: FAMILY MEDICINE

## 2018-10-25 PROCEDURE — 99214 OFFICE O/P EST MOD 30 MIN: CPT | Mod: 25 | Performed by: FAMILY MEDICINE

## 2018-10-25 ASSESSMENT — PATIENT HEALTH QUESTIONNAIRE - PHQ9
SUM OF ALL RESPONSES TO PHQ QUESTIONS 1-9: 3
5. POOR APPETITE OR OVEREATING: SEVERAL DAYS

## 2018-10-25 ASSESSMENT — ANXIETY QUESTIONNAIRES
6. BECOMING EASILY ANNOYED OR IRRITABLE: NOT AT ALL
7. FEELING AFRAID AS IF SOMETHING AWFUL MIGHT HAPPEN: NOT AT ALL
1. FEELING NERVOUS, ANXIOUS, OR ON EDGE: SEVERAL DAYS
3. WORRYING TOO MUCH ABOUT DIFFERENT THINGS: NOT AT ALL
IF YOU CHECKED OFF ANY PROBLEMS ON THIS QUESTIONNAIRE, HOW DIFFICULT HAVE THESE PROBLEMS MADE IT FOR YOU TO DO YOUR WORK, TAKE CARE OF THINGS AT HOME, OR GET ALONG WITH OTHER PEOPLE: NOT DIFFICULT AT ALL
GAD7 TOTAL SCORE: 3
2. NOT BEING ABLE TO STOP OR CONTROL WORRYING: SEVERAL DAYS
5. BEING SO RESTLESS THAT IT IS HARD TO SIT STILL: NOT AT ALL

## 2018-10-25 NOTE — PROGRESS NOTES
SUBJECTIVE:   Celine Judd is a 43 year old female who presents to clinic today for the following health issues:    Forms      FMLA sone for anxiety     Depression and Anxiety Follow-Up      Status since last visit: Worsened with mom's recet Dxes     Other associated symptoms:None    Complicating factors:     Significant life event: mom Dx ca and thenthyroid nodule last yr      Current substance abuse: None    Meds: none     PHQ-9 Score and MyChart F/U Questions 6/14/2016 7/13/2016 6/23/2017   Total Score 3 1 0   Q9: Suicide Ideation Not at all Not at all Not at all     No flowsheet data found.    PHQ-9  English=3  PHQ-9   Any Language  GAD7=3  Suicide Assessment Five-step Evaluation and Treatment (SAFE-T)    Hypertension Follow-up      Outpatient blood pressures are not being checked.    Here > 160/100 to 180/110 as is in pain today     Med: toprol 50mgm bid, norvasc 10mgm     HR also hi :      Low Salt Diet: no added salt    Glucose Intolerance Follow-up      Patient is checking blood sugars: not at all     Hi FBS s     Diabetic concerns: None     Symptoms of hypoglycemia (low blood sugar): none     Paresthesias (numbness or burning in feet) or sores: No     Date of last diabetic eye exam: 2016    fam hx DM       Amount of exercise or physical activity: None    Problems taking medications regularly: No    Medication side effects: none    Diet: regular (no restrictions)    Hypertension -Uncontrolled       Outpatient blood pressures are not being checked.   Here > 140/80 to  rankin     Low Salt Diet: not monitoring salt    Meds: norvasc 5mgm ; toprol 100mgm bid \    HR:        Problem list and histories reviewed & adjusted, as indicated.  Additional history: as documented    Labs reviewed in EPIC    Reviewed and updated as needed this visit by clinical staff  Tobacco  Allergies  Meds  Problems  Med Hx  Surg Hx  Fam Hx  Soc Hx        Reviewed and updated as needed this visit by  "Provider  Allergies  Meds  Problems         ROS:  CONSTITUTIONAL: NEGATIVE for fever, chills, change in weight  INTEGUMENTARY/SKIN: NEGATIVE for worrisome rashes, moles or lesions  EYES: NEGATIVE for vision changes or irritation  ENT/MOUTH: NEGATIVE for ear, mouth and throat problems  RESP: NEGATIVE for significant cough or SOB  BREAST: NEGATIVE for masses, tenderness or discharge  CV: NEGATIVE for chest pain, palpitations or peripheral edema  GI: NEGATIVE for nausea, abdominal pain, heartburn, or change in bowel habits  : NEGATIVE for frequency, dysuria, or hematuria  MUSCULOSKELETAL: NEGATIVE for significant arthralgias or myalgia  NEURO: NEGATIVE for weakness, dizziness or paresthesias  ENDOCRINE: NEGATIVE for temperature intolerance, skin/hair changes  HEME: NEGATIVE for bleeding problems  PSYCHIATRIC: POSITIVE for, agitation, anxiety, concentration difficulty, HX anxiety, HX depression, psychomotor agitation, stress and weight loss    OBJECTIVE:     /90  Pulse 90  Temp 98.7  F (37.1  C) (Tympanic)  Resp 14  Ht 5' 5\" (1.651 m)  Wt 111 lb (50.3 kg)  LMP  (LMP Unknown)  SpO2 98%  Breastfeeding? No  BMI 18.47 kg/m2  Body mass index is 18.47 kg/(m^2).  GENERAL: healthy, alert and no distress;under wt   EYES: Eyes grossly normal to inspection, PERRL and conjunctivae and sclerae normal  RESP: lungs clear to auscultation - no rales, rhonchi or wheezes  CV: regular rate and rhythm, normal S1 S2, no S3 or S4, no murmur, click or rub, no peripheral edema and peripheral pulses strong  MS: no gross musculoskeletal defects noted, no edema  SKIN: no suspicious lesions or rashes  NEURO: Normal strength and tone, mentation intact and speech normal  PSYCH: mentation appears normal, affect normal/bright, anxious and appearance well groomed    Diagnostic Test Results:  Results for orders placed or performed in visit on 07/12/18   Potassium   Result Value Ref Range    Potassium 4.0 3.4 - 5.3 mmol/L "       ASSESSMENT/PLAN:               ICD-10-CM    1. Benign essential hypertension-uncontrolled-issue of increasing meds I10    2. Anxiety F41.9    3. Tobacco use disorder: [age]y/o (3-13) @ 1/4 ppd=3-4 pk yr hx and continues at 1/5 ppd since  F17.200    4. Depo-Provera contraceptive status Z30.42    5. Major depression in complete remission (H) on meds since 4-24-15; onset 2011 F32.5    6. Need for prophylactic vaccination and inoculation against influenza Z23        Patient Instructions   1. To decrease your blood pressure:     1) decrease your salt in your diet   2) increase greens   3) decrease red meat   4) increase fiber in diet   5) increase exercise   6) lose weight (if you are over weight )       2. Increase the metorprolol from 100mgm 2 x a day to 150mgm = 1.5 tabs po 2 x a day     Continue the norvasc at 5mgm     Your blood pressure has been high or you are starting a new medication for it :   Please take 2-4 blood pressures and heart rates a week and write them down with date, time of day and location and bring this record in in 3-5 weeks. The optimal blood  pressure is < 140/80 or close to this most of the time.        Latonia Nguyen MD  [facility]      Follow Up Injection    Patient returning during stated date range given at previous visit: Yes      If here at the correct interval:   BP Readings from Last 1 Encounters:   10/25/18 130/90     Wt Readings from Last 1 Encounters:   10/25/18 111 lb (50.3 kg)     Side effects or problems with last injection?  No.  Date range is given to patient for next dose: 01/10/19-01/24/19    See Medication Note for administration information    Staff Sig: Martina Archuleta, [facility]

## 2018-10-25 NOTE — MR AVS SNAPSHOT
After Visit Summary   10/25/2018    Celine Judd    MRN: [id number]           Patient Information     Date Of Birth          1975        Visit Information        Provider Department      10/25/2018 11:40 AM Latonia Nguyen MD [facility]        Today's Diagnoses     Contraception    -  1    Screening for malignant neoplasm of cervix        Tobacco use disorder        Need for prophylactic vaccination and inoculation against influenza          Care Instructions    1. To decrease your blood pressure:     1) decrease your salt in your diet   2) increase greens   3) decrease red meat   4) increase fiber in diet   5) increase exercise   6) lose weight (if you are over weight )       2. Increase the metorprolol from 100mgm 2 x a day to 150mgm = 1.5 tabs po 2 x a day     Continue the norvasc at 5mgm     Your blood pressure has been high or you are starting a new medication for it :   Please take 2-4 blood pressures and heart rates a week and write them down with date, time of day and location and bring this record in in 3-5 weeks. The optimal blood  pressure is < 140/80 or close to this most of the time.            Follow-ups after your visit        Follow-up notes from your care team     Return in about 4 weeks (around 11/22/2018) for BP Recheck.      Who to contact     If you have questions or need follow up information about today's clinic visit or your schedule please contact [facility] directly at [phone].  Normal or non-critical lab and imaging results will be communicated to you by MyChart, letter or phone within 4 business days after the clinic has received the results. If you do not hear from us within 7 days, please contact the clinic through MyChart or phone. If you have a critical or abnormal lab result, we will notify you by phone as soon as possible.  Submit refill requests through [employer] or call your  "pharmacy and they will forward the refill request to us. Please allow 3 business days for your refill to be completed.          Additional Information About Your Visit        MyChart Information     [employer] lets you send messages to your doctor, view your test results, renew your prescriptions, schedule appointments and more. To sign up, go to [email or web address] . Click on \"Log in\" on the left side of the screen, which will take you to the Welcome page. Then click on \"Sign up Now\" on the right side of the page.     You will be asked to enter the access code listed below, as well as some personal information. Please follow the directions to create your username and password.     Your access code is: [email or web address]  Expires: 2019  2:33 PM     Your access code will  in 90 days. If you need help or a new code, please call your [place] clinic or [phone].        Care EveryWhere ID     This is your [facility] EveryWhere ID. This could be used by other organizations to access your [place] medical records  [id number]        Your Vitals Were     Pulse Temperature Respirations Height Last Period Pulse Oximetry    90 98.7  F (37.1  C) (Tympanic) 14 5' 5\" (1.651 m) (LMP Unknown) 98%    Breastfeeding? BMI (Body Mass Index)                No 18.47 kg/m2           Blood Pressure from Last 3 Encounters:   10/25/18 130/90   18 (!) 124/96   18 138/84    Weight from Last 3 Encounters:   10/25/18 111 lb (50.3 kg)   18 113 lb (51.3 kg)   18 113 lb (51.3 kg)              We Performed the Following     C Medroxyprogesterone inj/1mg (J-Code)     THER/PROPH/DIAG INJ, SC/IM        Primary Care Provider Office Phone # Fax #    Latonia Nguyen [phone] [address] [facility] [address]        Equal Access to Services     [facility] ASIF : Luz Maria Villalobos, waaxda luqadaaracely winters waxay idiin hayaan adeeg kharash la'aan ah. So wa " [phone].    ATENCIÓN: Si alexandra hayden, tiene a iglesias disposición servicios gratuitos de asistencia lingüística. Trent ayers [phone].    We comply with applicable federal civil rights laws and [place] laws. We do not discriminate on the basis of race, color, national origin, age, disability, sex, sexual orientation, or gender identity.            Thank you!     Thank you for choosing [facility]  for your care. Our goal is always to provide you with excellent care. Hearing back from our patients is one way we can continue to improve our services. Please take a few minutes to complete the written survey that you may receive in the mail after your visit with us. Thank you!             Your Updated Medication List - Protect others around you: Learn how to safely use, store and throw away your medicines at www.disposemymeds.org.          This list is accurate as of 10/25/18  2:33 PM.  Always use your most recent med list.                   Brand Name Dispense Instructions for use Diagnosis    acetaminophen 500 MG tablet    TYLENOL    100 tablet    Take 2 tablets po q 6 hr prn pain    Acute pain of left knee       amLODIPine 5 MG tablet    NORVASC    90 tablet    Take one tab po q d    Benign essential hypertension       BENADRYL PO           cholecalciferol 5000 units Caps capsule    vitamin D3    90 capsule    Take 1 capsule (5,000 Units) by mouth daily    Vitamin D deficiency disease       loratadine 10 MG tablet    CLARITIN    90 tablet    Take 1 tablet (10 mg) by mouth daily    Hypokalemia       medroxyPROGESTERone 150 MG/ML injection    DEPO-PROVERA    1 mL    Inject 1 mL (150 mg) into the muscle every 3 months    Contraception       metoprolol tartrate 100 MG tablet    LOPRESSOR    90 tablet    Take 1 tablet (100 mg) by mouth 2 times daily    Benign essential hypertension       naproxen 500 MG tablet    NAPROSYN    60 tablet    Take 1 tablet (500 mg) by mouth 2 times daily (with  meals)    Pain in joint involving ankle and foot, left       potassium chloride SA 10 MEQ CR tablet    K-DUR/KLOR-CON M    20 tablet    Take 1 tablet (10 mEq) by mouth 2 times daily    Hypokalemia       ranitidine 150 MG tablet    ZANTAC    180 tablet    Take 1 tablet (150 mg) by mouth 2 times daily    Gastroesophageal reflux disease without esophagitis

## 2018-10-25 NOTE — PATIENT INSTRUCTIONS
1. To decrease your blood pressure:     1) decrease your salt in your diet   2) increase greens   3) decrease red meat   4) increase fiber in diet   5) increase exercise   6) lose weight (if you are over weight )       2. Increase the metorprolol from 100mgm 2 x a day to 150mgm = 1.5 tabs po 2 x a day     Continue the norvasc at 5mgm     Your blood pressure has been high or you are starting a new medication for it :   Please take 2-4 blood pressures and heart rates a week and write them down with date, time of day and location and bring this record in in 3-5 weeks. The optimal blood  pressure is < 140/80 or close to this most of the time.

## 2018-10-25 NOTE — NURSING NOTE
"Chief Complaint   Patient presents with     Forms     Recheck Medication     /90  Pulse 90  Temp 98.7  F (37.1  C) (Tympanic)  Resp 14  Ht 5' 5\" (1.651 m)  Wt 111 lb (50.3 kg)  LMP  (LMP Unknown)  SpO2 98%  Breastfeeding? No  BMI 18.47 kg/m2 Estimated body mass index is 18.47 kg/(m^2) as calculated from the following:    Height as of this encounter: 5' 5\" (1.651 m).    Weight as of this encounter: 111 lb (50.3 kg).  BP completed using cuff size: regular   Martina Archuleta CMA    Health Maintenance Due   Topic Date Due     EYE EXAM Q1 YEAR  05/14/2016     COLPOSCOPY Q6 MOS  06/22/2017     INFLUENZA VACCINE (1) 09/01/2018     PAP Q6 MOS DIAGNOSTIC  09/16/2018     TOBACCO CESSATION COUNSELING Q1 YR  11/09/2018     Health Maintenance reviewed at today's visit patient asked to schedule/complete:   Cervical Cancer:  Patient agrees to schedule  Diabetes:  Patient agrees to schedule  Immunizations:  Completed at today's visit.    "

## 2018-10-26 ASSESSMENT — ANXIETY QUESTIONNAIRES: GAD7 TOTAL SCORE: 3

## 2018-12-14 ENCOUNTER — TELEPHONE (OUTPATIENT)
Dept: FAMILY MEDICINE | Facility: CLINIC | Age: 43
End: 2018-12-14

## 2018-12-14 DIAGNOSIS — R87.612 PAPANICOLAOU SMEAR OF CERVIX WITH LOW GRADE SQUAMOUS INTRAEPITHELIAL LESION (LGSIL): ICD-10-CM

## 2018-12-14 NOTE — TELEPHONE ENCOUNTER
Pt is past due for Pap follow up  Reminder letter has been sent  LMTC her clinic with any questions or to schedule    Layne Bolton,   Pap Tracking

## 2019-01-11 ENCOUNTER — ALLIED HEALTH/NURSE VISIT (OUTPATIENT)
Dept: NURSING | Facility: CLINIC | Age: 44
End: 2019-01-11
Payer: COMMERCIAL

## 2019-01-11 DIAGNOSIS — Z30.9 CONTRACEPTIVE MANAGEMENT: Primary | ICD-10-CM

## 2019-01-11 PROCEDURE — 99207 ZZC NO CHARGE NURSE ONLY: CPT

## 2019-01-11 PROCEDURE — 96372 THER/PROPH/DIAG INJ SC/IM: CPT

## 2019-02-12 DIAGNOSIS — K21.9 GASTROESOPHAGEAL REFLUX DISEASE WITHOUT ESOPHAGITIS: ICD-10-CM

## 2019-02-12 DIAGNOSIS — I10 BENIGN ESSENTIAL HYPERTENSION: ICD-10-CM

## 2019-02-12 NOTE — TELEPHONE ENCOUNTER
Reason for Call:  Medication or medication refill:  Do you use a [facility]?  Name of the pharmacy and phone number for the current request:  [facility] [address]  [phone]  Name of the medication requested: amLODIPine (NORVASC) 5 MG tablet,  loratadine (CLARITIN) 10 MG tablet , metoprolol tartrate (LOPRESSOR) 100 MG tablet and  ranitidine (ZANTAC) 150 MG tablet    Other request: none  Can we leave a detailed message on this number? YES  Phone number patient can be reached at: Home number on file [phone] (home)  Best Time: any  Call taken on 2/12/2019 at 3:06 PM by ALAYNA ARORA

## 2019-02-12 NOTE — TELEPHONE ENCOUNTER
"Requested Prescriptions   Pending Prescriptions Disp Refills     amLODIPine (NORVASC) 5 MG tablet  Last Written Prescription Date:  9/28/2018  Last Fill Quantity: 90 tablet,  # refills: 1   Last office visit: 10/25/2018 with prescribing provider:  GUMARO Nguyen   Future Office Visit:     90 tablet 1     Sig: Take one tab po q d    Calcium Channel Blockers Protocol  Failed - 2/12/2019  3:12 PM       Failed - Blood pressure under 140/90 in past 12 months    BP Readings from Last 3 Encounters:   10/25/18 130/90   09/07/18 (!) 124/96   09/06/18 138/84                Failed - Normal serum creatinine on file in past 12 months    Recent Labs   Lab Test 03/16/18  1400   CR 1.14*            Passed - Recent (12 mo) or future (30 days) visit within the authorizing provider's specialty    Patient had office visit in the last 12 months or has a visit in the next 30 days with authorizing provider or within the authorizing provider's specialty.  See \"Patient Info\" tab in inbasket, or \"Choose Columns\" in Meds & Orders section of the refill encounter.             Passed - Medication is active on med list       Passed - Patient is age 18 or older       Passed - No active pregnancy on record       Passed - No positive pregnancy test in past 12 months        metoprolol tartrate (LOPRESSOR) 100 MG tablet  Last Written Prescription Date:  9/28/2018  Last Fill Quantity: 90 tablet,  # refills: 1   Last office visit: 10/25/2018 with prescribing provider:  GUMARO Nguyen   Future Office Visit:     90 tablet 1     Sig: Take 1 tablet (100 mg) by mouth 2 times daily    Beta-Blockers Protocol Failed - 2/12/2019  3:12 PM       Failed - Blood pressure under 140/90 in past 12 months    BP Readings from Last 3 Encounters:   10/25/18 130/90   09/07/18 (!) 124/96   09/06/18 138/84                Passed - Patient is age 6 or older       Passed - Recent (12 mo) or future (30 days) visit within the authorizing provider's specialty    Patient had office visit " "in the last 12 months or has a visit in the next 30 days with authorizing provider or within the authorizing provider's specialty.  See \"Patient Info\" tab in inbasket, or \"Choose Columns\" in Meds & Orders section of the refill encounter.             Passed - Medication is active on med list        ranitidine (ZANTAC) 150 MG tablet  Last Written Prescription Date:  9/28/2018  Last Fill Quantity: 180 tablet,  # refills: 1   Last office visit: 10/25/2018 with prescribing provider:  GUMARO Nguyen   Future Office Visit:     180 tablet 1     Sig: Take 1 tablet (150 mg) by mouth 2 times daily    H2 Blockers Protocol Passed - 2/12/2019  3:12 PM       Passed - Patient is age 12 or older       Passed - Recent (12 mo) or future (30 days) visit within the authorizing provider's specialty    Patient had office visit in the last 12 months or has a visit in the next 30 days with authorizing provider or within the authorizing provider's specialty.  See \"Patient Info\" tab in inbasket, or \"Choose Columns\" in Meds & Orders section of the refill encounter.             Passed - Medication is active on med list           "

## 2019-02-15 RX ORDER — AMLODIPINE BESYLATE 5 MG/1
TABLET ORAL
Qty: 90 TABLET | Refills: 1 | OUTPATIENT
Start: 2019-02-15

## 2019-02-15 RX ORDER — METOPROLOL TARTRATE 100 MG
100 TABLET ORAL 2 TIMES DAILY
Qty: 90 TABLET | Refills: 1 | OUTPATIENT
Start: 2019-02-15

## 2019-02-15 NOTE — TELEPHONE ENCOUNTER
Routing refill request to provider for review/approval because:  Labs out of range:  Cr 1.14  BP >140/90    Ranitidine prescription approved per [facility] [place].

## 2019-03-15 ENCOUNTER — ANCILLARY PROCEDURE (OUTPATIENT)
Dept: MAMMOGRAPHY | Facility: CLINIC | Age: 44
End: 2019-03-15
Payer: COMMERCIAL

## 2019-03-15 DIAGNOSIS — Z12.31 VISIT FOR SCREENING MAMMOGRAM: ICD-10-CM

## 2019-03-15 PROCEDURE — 77067 SCR MAMMO BI INCL CAD: CPT | Mod: TC

## 2019-04-05 ENCOUNTER — ALLIED HEALTH/NURSE VISIT (OUTPATIENT)
Dept: NURSING | Facility: CLINIC | Age: 44
End: 2019-04-05
Payer: COMMERCIAL

## 2019-04-05 DIAGNOSIS — Z30.42 DEPO-PROVERA CONTRACEPTIVE STATUS: Primary | ICD-10-CM

## 2019-04-05 PROCEDURE — 96372 THER/PROPH/DIAG INJ SC/IM: CPT

## 2019-04-05 PROCEDURE — 99207 ZZC NO CHARGE LOS: CPT

## 2019-04-05 RX ORDER — MEDROXYPROGESTERONE ACETATE 150 MG/ML
150 INJECTION, SUSPENSION INTRAMUSCULAR
Status: ACTIVE | OUTPATIENT
Start: 2019-04-05

## 2019-04-05 RX ORDER — MEDROXYPROGESTERONE ACETATE 150 MG/ML
150 INJECTION, SUSPENSION INTRAMUSCULAR
Qty: 1 ML | Refills: 0 | Status: CANCELLED | OUTPATIENT
Start: 2019-04-05

## 2019-04-05 RX ADMIN — MEDROXYPROGESTERONE ACETATE 150 MG: 150 INJECTION, SUSPENSION INTRAMUSCULAR at 15:11

## 2019-04-05 NOTE — PROGRESS NOTES
Follow Up Injection    Patient returning during stated date range given at previous visit: Yes    If here at the correct interval:   BP Readings from Last 1 Encounters:   10/25/18 130/90     Wt Readings from Last 1 Encounters:   10/25/18 50.3 kg (111 lb)       Last Pap/exam date:       Side effects or problems with last injection?  No.  Date range is given to patient for next dose: 06/21/2019-07/05/2019    See Medication Note for administration information    Staff Sig: Martina Archuleta CMA

## 2019-05-24 ENCOUNTER — TELEPHONE (OUTPATIENT)
Dept: FAMILY MEDICINE | Facility: CLINIC | Age: 44
End: 2019-05-24

## 2019-05-24 ENCOUNTER — OFFICE VISIT (OUTPATIENT)
Dept: FAMILY MEDICINE | Facility: CLINIC | Age: 44
End: 2019-05-24
Payer: COMMERCIAL

## 2019-05-24 VITALS
WEIGHT: 114 LBS | BODY MASS INDEX: 18.99 KG/M2 | SYSTOLIC BLOOD PRESSURE: 120 MMHG | HEIGHT: 65 IN | TEMPERATURE: 98.7 F | RESPIRATION RATE: 20 BRPM | DIASTOLIC BLOOD PRESSURE: 80 MMHG | OXYGEN SATURATION: 98 % | HEART RATE: 117 BPM

## 2019-05-24 DIAGNOSIS — I10 BENIGN ESSENTIAL HYPERTENSION: ICD-10-CM

## 2019-05-24 DIAGNOSIS — F41.9 ANXIETY: ICD-10-CM

## 2019-05-24 DIAGNOSIS — L03.119 CELLULITIS AND ABSCESS OF LEG: Primary | ICD-10-CM

## 2019-05-24 DIAGNOSIS — F32.5 MAJOR DEPRESSION IN COMPLETE REMISSION (H): ICD-10-CM

## 2019-05-24 DIAGNOSIS — N18.30 CKD (CHRONIC KIDNEY DISEASE) STAGE 3, GFR 30-59 ML/MIN (H): ICD-10-CM

## 2019-05-24 DIAGNOSIS — R00.0 SINUS TACHYCARDIA: ICD-10-CM

## 2019-05-24 DIAGNOSIS — E44.1 MILD PROTEIN-CALORIE MALNUTRITION (H): ICD-10-CM

## 2019-05-24 DIAGNOSIS — L02.419 CELLULITIS AND ABSCESS OF LEG: Primary | ICD-10-CM

## 2019-05-24 DIAGNOSIS — E78.1 HYPERTRIGLYCERIDEMIA: ICD-10-CM

## 2019-05-24 PROCEDURE — 99214 OFFICE O/P EST MOD 30 MIN: CPT | Performed by: FAMILY MEDICINE

## 2019-05-24 RX ORDER — CEPHALEXIN 500 MG/1
500 CAPSULE ORAL 4 TIMES DAILY
Qty: 40 CAPSULE | Refills: 0 | Status: SHIPPED | OUTPATIENT
Start: 2019-05-24 | End: 2020-07-02

## 2019-05-24 RX ORDER — AMLODIPINE BESYLATE 10 MG/1
10 TABLET ORAL DAILY
Qty: 90 TABLET | Refills: 0 | Status: SHIPPED | OUTPATIENT
Start: 2019-05-24 | End: 2020-07-02

## 2019-05-24 ASSESSMENT — ANXIETY QUESTIONNAIRES
5. BEING SO RESTLESS THAT IT IS HARD TO SIT STILL: NOT AT ALL
3. WORRYING TOO MUCH ABOUT DIFFERENT THINGS: NOT AT ALL
6. BECOMING EASILY ANNOYED OR IRRITABLE: SEVERAL DAYS
IF YOU CHECKED OFF ANY PROBLEMS ON THIS QUESTIONNAIRE, HOW DIFFICULT HAVE THESE PROBLEMS MADE IT FOR YOU TO DO YOUR WORK, TAKE CARE OF THINGS AT HOME, OR GET ALONG WITH OTHER PEOPLE: NOT DIFFICULT AT ALL
2. NOT BEING ABLE TO STOP OR CONTROL WORRYING: NOT AT ALL
1. FEELING NERVOUS, ANXIOUS, OR ON EDGE: NOT AT ALL
7. FEELING AFRAID AS IF SOMETHING AWFUL MIGHT HAPPEN: NOT AT ALL
GAD7 TOTAL SCORE: 2

## 2019-05-24 ASSESSMENT — PATIENT HEALTH QUESTIONNAIRE - PHQ9
SUM OF ALL RESPONSES TO PHQ QUESTIONS 1-9: 1
5. POOR APPETITE OR OVEREATING: SEVERAL DAYS

## 2019-05-24 ASSESSMENT — MIFFLIN-ST. JEOR: SCORE: 1167.98

## 2019-05-24 NOTE — PATIENT INSTRUCTIONS
1. Warm soak  10min 2 times a day    2. Squeeze dry     3. Keep the injured area above the level of the heart as much as possible to help decrease the pain and  the healing time . Put pillows on either side while sleeping to keep the arm or leg elevated     4. Your blood pressure has been high or you are starting a new medication for it : take 2 of the 5mgm amlodipine and then take on e of the new 10mgm one s   Please take 2-4 blood pressures and heart rates a week and write them down with date, time of day and location and bring this record in in 3-5 weeks. The optimal blood  pressure is < 140/80 or close to this most of the time. Come back ub 3-4 weeks with BP record

## 2019-05-24 NOTE — PROGRESS NOTES
Subjective     Celine Judd is a 43 year old female who presents to clinic today for the following health issues:    HPI     Insect Bite-spider       Duration: 05/22/2019    Description (location/character/radiation): post Rt calf     increaseing swelling. Redness, tender ness     Intensity:  mild    Accompanying signs and symptoms: Inflamed    History (similar episodes/previous evaluation): None    Precipitating or alleviating factors: None    Therapies tried and outcome: Neosporin, Peroxide, Heat, Cold and Elevation/No Relief     Hyperlipidemia :Triglyceride Follow-Up      Are you having any of the following symptoms? (Select all that apply)  No complaints of shortness of breath, chest pain or pressure.  No increased sweating or nausea with activity.  No left-sided neck or arm pain.  No complaints of pain in calves when walking 1-2 blocks.    Are you regularly taking any medication or supplement to lower your cholesterol?   No    Are you having muscle aches or other side effects that you think could be caused by your cholesterol lowering medication?  No      Hypertension --conts uncontrolled       Do you check your blood pressure regularly outside of the clinic? Yes  Run 130-145/ 80's    Here are that or higher     Are you following a low salt diet? No    Are your blood pressures ever more than 140 on the top number (systolic) OR more   than 90 on the bottom number (diastolic), for example 140/90? Yes   Med: norvasc 5mgm     TACHYCARDIA     - to 119 from 92 /min   -chronic     Depression and Anxiety Follow-Up    How are you doing with your depression since your last visit? No change-remission    How are you doing with your anxiety since your last visit?  No change    Are you having other symptoms that might be associated with depression or anxiety? No    Have you had a significant life event? No     Do you have any concerns with your use of alcohol or other drugs? No    Social History     Tobacco Use      "Smoking status: Current Every Day Smoker     Packs/day: 0.25     Years: 10.00     Pack years: 2.50     Types: Cigarettes     Smokeless tobacco: Current User   Substance Use Topics     Alcohol use: Yes     Alcohol/week: 0.0 oz     Comment: 2d 3x/wk max     Drug use: Yes     Types: Marijuana     PHQ 9/6/2018 10/25/2018 5/24/2019   PHQ-9 Total Score 4 3 1   Q9: Thoughts of better off dead/self-harm past 2 weeks Not at all Not at all Not at all     NURA-7 SCORE 9/6/2018 10/25/2018 5/24/2019   Total Score 9 3 2           Suicide Assessment Five-step Evaluation and Treatment (SAFE-T)  Chronic Kidney Disease:Stage 3  Follow-up      Current NSAID use?  No    Comorbid HTN, glu intol, hi trigs ,             Reviewed and updated as needed this visit by Provider         Review of Systems   ROS COMP: CONSTITUTIONAL: NEGATIVE for fever, chills, change in weight  INTEGUMENTARY/SKIN: NEGATIVE for worrisome rashes, moles or lesions  POS red swelling rt calf   EYES: NEGATIVE for vision changes or irritation  ENT/MOUTH: NEGATIVE for ear, mouth and throat problems  RESP: NEGATIVE for significant cough or SOB  BREAST: NEGATIVE for masses, tenderness or discharge  CV: NEGATIVE for chest pain, palpitations or peripheral edema  GI: NEGATIVE for nausea, abdominal pain, heartburn, or change in bowel habits  : NEGATIVE for frequency, dysuria, or hematuria  MUSCULOSKELETAL: NEGATIVE for significant arthralgias or myalgia  NEURO: NEGATIVE for weakness, dizziness or paresthesias  ENDOCRINE: NEGATIVE for temperature intolerance, skin/hair changes  HEME: NEGATIVE for bleeding problems  PSYCHIATRIC: NEGATIVE for changes in mood or affect      Objective    /80   Pulse 117   Temp 98.7  F (37.1  C) (Tympanic)   Resp 20   Ht 1.651 m (5' 5\")   Wt 51.7 kg (114 lb)   LMP  (LMP Unknown)   SpO2 98%   Breastfeeding? No   BMI 18.97 kg/m    Body mass index is 18.97 kg/m .  Physical Exam   GENERAL: healthy, alert, no distress, frail and under " wt  EYES: Eyes grossly normal to inspection, PERRL and conjunctivae and sclerae normal  RESP: lungs clear to auscultation - no rales, rhonchi or wheezes  CV: regular rate and rhythm, normal S1 S2, no S3 or S4, no murmur, click or rub, no peripheral edema and peripheral pulses strong  MS: no gross musculoskeletal defects noted, no edema  SKIN: no suspicious lesions or rashes  POS diffuse swelling over  10 cm post Rt calf + tender and red center x 4 cm with bite black center   Neg proximally   NEURO: Normal strength and tone, mentation intact and speech normal  PSYCH: mentation appears normal, affect normal/bright            Assessment & Plan       ICD-10-CM    1. Cellulitis and abscess of leg Rt calf from spider bite 5-22-19 L03.119 cephALEXin (KEFLEX) 500 MG capsule    L02.419    2. Benign essential hypertension-uncontrolled- issue of increasing meds  I10 amLODIPine (NORVASC) 10 MG tablet   3. Mild protein-calorie malnutrition (H) E44.1    4. Major depression in complete remission (H) F32.5    5. CKD (chronic kidney disease) stage 3, GFR 30-59 ml/min (H) N18.3    6. Sinus tachycardia R00.0    7. Anxiety F41.9    8. Hypertriglyceridemia E78.1         Tobacco Cessation:   reports that she has been smoking cigarettes.  She has a 2.50 pack-year smoking history. She uses smokeless tobacco.          Patient Instructions   1. Warm soak  10min 2 times a day    2. Squeeze dry     3. Keep the injured area above the level of the heart as much as possible to help decrease the pain and  the healing time . Put pillows on either side while sleeping to keep the arm or leg elevated     4. Your blood pressure has been high or you are starting a new medication for it : take 2 of the 5mgm amlodipine and then take on e of the new 10mgm one s   Please take 2-4 blood pressures and heart rates a week and write them down with date, time of day and location and bring this record in in 3-5 weeks. The optimal blood  pressure is < 140/80 or  close to this most of the time. Come back ub 3-4 weeks with BP record         Return in about 4 days (around 5/28/2019) for wound check.    Weight management plan noted, stable and monitoring      Latonia Nguyen MD  [facility]

## 2019-05-24 NOTE — LETTER
[facility]  [address]  [address]  [place] [address]  [phone]                                                                                                           Celine Judd  [address]  [place] [address]    May 24, 2019      Dear Celine,    Seen by me today.    Needs to be off work this pm     Thank you for choosing [facility].  We appreciate the opportunity to serve you and look forward to supporting your healthcare needs in the future.    If you have any questions or concerns, please call me or my staff at [phone].      Sincerely,    Latonia Nguyen MD

## 2019-05-24 NOTE — NURSING NOTE
"Chief Complaint   Patient presents with     Insect Bites     /80   Pulse 117   Temp 98.7  F (37.1  C) (Tympanic)   Resp 20   Ht 1.651 m (5' 5\")   Wt 51.7 kg (114 lb)   LMP  (LMP Unknown)   SpO2 98%   Breastfeeding? No   BMI 18.97 kg/m   Estimated body mass index is 18.97 kg/m  as calculated from the following:    Height as of this encounter: 1.651 m (5' 5\").    Weight as of this encounter: 51.7 kg (114 lb).  BP completed using cuff size: leonid Archuleta CMA    Health Maintenance Due   Topic Date Due     LIPID  1974     DEPRESSION ACTION PLAN  1974     PAP  1974     HIV SCREENING  07/14/1989     DIABETIC EYE EXAM  05/14/2016     PREVENTIVE CARE VISIT  03/16/2019     PHQ-9  04/25/2019     Health Maintenance reviewed at today's visit patient asked to schedule/complete:   Routine Health Visit: Patient agrees to schedule  Cervical Cancer:  Patient agrees to schedule  Depression:  Patient agrees to schedule    "

## 2019-05-24 NOTE — TELEPHONE ENCOUNTER
Call received from patient.     States she is at pharmacy and requesting the ABX be sent to her pharmacy at this time.     Orders were signed by PCP, faxed to pharmacy.

## 2019-05-25 ASSESSMENT — ANXIETY QUESTIONNAIRES: GAD7 TOTAL SCORE: 2

## 2019-05-28 ENCOUNTER — OFFICE VISIT (OUTPATIENT)
Dept: FAMILY MEDICINE | Facility: CLINIC | Age: 44
End: 2019-05-28
Payer: COMMERCIAL

## 2019-05-28 VITALS
BODY MASS INDEX: 18.99 KG/M2 | OXYGEN SATURATION: 93 % | WEIGHT: 114 LBS | HEIGHT: 65 IN | TEMPERATURE: 98.6 F | RESPIRATION RATE: 18 BRPM | SYSTOLIC BLOOD PRESSURE: 150 MMHG | DIASTOLIC BLOOD PRESSURE: 100 MMHG | HEART RATE: 77 BPM

## 2019-05-28 DIAGNOSIS — L02.419 CELLULITIS AND ABSCESS OF LEG: Primary | ICD-10-CM

## 2019-05-28 DIAGNOSIS — I10 BENIGN ESSENTIAL HYPERTENSION: ICD-10-CM

## 2019-05-28 DIAGNOSIS — L03.119 CELLULITIS AND ABSCESS OF LEG: Primary | ICD-10-CM

## 2019-05-28 PROCEDURE — 99213 OFFICE O/P EST LOW 20 MIN: CPT | Performed by: FAMILY MEDICINE

## 2019-05-28 ASSESSMENT — MIFFLIN-ST. JEOR: SCORE: 1167.98

## 2019-05-28 NOTE — NURSING NOTE
"Chief Complaint   Patient presents with     Wound Check     BP (!) 150/100   Pulse 77   Temp 98.6  F (37  C) (Tympanic)   Resp 18   Ht 1.651 m (5' 5\")   Wt 51.7 kg (114 lb)   LMP  (LMP Unknown)   SpO2 93%   Breastfeeding? No   BMI 18.97 kg/m   Estimated body mass index is 18.97 kg/m  as calculated from the following:    Height as of this encounter: 1.651 m (5' 5\").    Weight as of this encounter: 51.7 kg (114 lb).  BP completed using cuff size: regular   Martina Archuleta CMA    Health Maintenance Due   Topic Date Due     DIABETIC EYE EXAM  05/14/2016     PAP  09/16/2018     PREVENTIVE CARE VISIT  03/16/2019     Health Maintenance reviewed at today's visit patient asked to schedule/complete:   Routine Health Visit: Patient agrees to schedule  Cervical Cancer:  Patient agrees to schedule  Diabetes:  Patient agrees to schedule    "

## 2019-05-28 NOTE — PROGRESS NOTES
Subjective     Celine Judd is a 43 year old female who presents to clinic today for the following health issues:    HPI     Insect Bite-spider       Duration: 05/22/2019    abx and warm soaks since 5-24-19    Description (location/character/radiation): post Rt calf     increaseing swelling. Redness, tender ness     Intensity:  mild    Accompanying signs and symptoms: Inflamed and Draining    History (similar episodes/previous evaluation): None    Precipitating or alleviating factors: None    Therapies tried and outcome: Antibiotics, Neosporin, Peroxide, Heat, Cold and Elevation/    Did rupture on 5-26-19& is less red andtender              Hypertension --conts uncontrolled       Do you check your blood pressure regularly outside of the clinic? Yes  Run 130-145/ 80's    Here are that or higher     Are you following a low salt diet? No    Are your blood pressures ever more than 140 on the top number (systolic) OR more   than 90 on the bottom number (diastolic), for example 140/90? Yes   Med: norvasc 10mgm & increase in metoprolol  From 100mgm bid to one am and 2 pm on 5-24-19    TACHYCARDIA       - to 119 from 92 /min      -now 77 o n increased B blocker     -chronic       Social History     Tobacco Use     Smoking status: Current Every Day Smoker     Packs/day: 0.25     Years: 10.00     Pack years: 2.50     Types: Cigarettes     Smokeless tobacco: Current User   Substance Use Topics     Alcohol use: Yes     Alcohol/week: 0.0 oz     Comment: 2d 3x/wk max     Drug use: Yes     Types: Marijuana     PHQ 9/6/2018 10/25/2018 5/24/2019   PHQ-9 Total Score 4 3 1   Q9: Thoughts of better off dead/self-harm past 2 weeks Not at all Not at all Not at all     NURA-7 SCORE 9/6/2018 10/25/2018 5/24/2019   Total Score 9 3 2   Suicide Assessment Five-step Evaluation and Treatment (SAFE-T)            Amount of exercise or physical activity: None    Problems taking medications regularly: No    Medication side effects:  "none    Diet: regular (no restrictions)              Reviewed and updated as needed this visit by Provider         Review of Systems   ROS COMP: CONSTITUTIONAL: NEGATIVE for fever, chills, change in weight  INTEGUMENTARY/SKIN: NEGATIVE for worrisome rashes, moles or lesions  POS open area of bite on leg   EYES: NEGATIVE for vision changes or irritation  ENT/MOUTH: NEGATIVE for ear, mouth and throat problems  RESP: NEGATIVE for significant cough or SOB  BREAST: NEGATIVE for masses, tenderness or discharge  CV: NEGATIVE for chest pain, palpitations or peripheral edema  GI: NEGATIVE for nausea, abdominal pain, heartburn, or change in bowel habits  : NEGATIVE for frequency, dysuria, or hematuria  MUSCULOSKELETAL: NEGATIVE for significant arthralgias or myalgia  NEURO: NEGATIVE for weakness, dizziness or paresthesias  ENDOCRINE: NEGATIVE for temperature intolerance, skin/hair changes  HEME: NEGATIVE for bleeding problems  PSYCHIATRIC: NEGATIVE for changes in mood or affect      Objective    BP (!) 150/100   Pulse 77   Temp 98.6  F (37  C) (Tympanic)   Resp 18   Ht 1.651 m (5' 5\")   Wt 51.7 kg (114 lb)   LMP  (LMP Unknown)   SpO2 93%   Breastfeeding? No   BMI 18.97 kg/m    Body mass index is 18.97 kg/m .  Physical Exam   GENERAL: healthy, alert and no distress  EYES: Eyes grossly normal to inspection, PERRL and conjunctivae and sclerae normal  RESP: lungs clear to auscultation - no rales, rhonchi or wheezes  MS: no gross musculoskeletal defects noted, no edema  SKIN: no suspicious lesions or rashes  POS still red x 20 cm but less red andless tender   NEURO: Normal strength and tone, mentation intact and speech normal  PSYCH: mentation appears normal, affect normal/bright            Assessment & Plan       ICD-10-CM    1. Cellulitis and abscess of leg Rt calf from spider bite 5-22-19 L03.119     L02.419    2. Benign essential hypertension-uncontrolled- after increasing meds on 5-24-19 I10         Tobacco " Cessation:   reports that she has been smoking cigarettes.  She has a 2.50 pack-year smoking history. She uses smokeless tobacco.          Patient Instructions   1. Cont warm soaks 10min bid and probe with Q tip and press dry           Return in about 3 days (around 5/31/2019) for wound check, BP Recheck.    Latonia Nguyen MD  [facility]

## 2019-05-31 ENCOUNTER — OFFICE VISIT (OUTPATIENT)
Dept: FAMILY MEDICINE | Facility: CLINIC | Age: 44
End: 2019-05-31
Payer: COMMERCIAL

## 2019-05-31 VITALS
OXYGEN SATURATION: 98 % | RESPIRATION RATE: 12 BRPM | HEIGHT: 65 IN | DIASTOLIC BLOOD PRESSURE: 100 MMHG | WEIGHT: 114 LBS | HEART RATE: 112 BPM | SYSTOLIC BLOOD PRESSURE: 140 MMHG | BODY MASS INDEX: 18.99 KG/M2 | TEMPERATURE: 99.1 F

## 2019-05-31 DIAGNOSIS — I10 BENIGN ESSENTIAL HYPERTENSION: ICD-10-CM

## 2019-05-31 DIAGNOSIS — L02.419 CELLULITIS AND ABSCESS OF LEG: Primary | ICD-10-CM

## 2019-05-31 DIAGNOSIS — L03.119 CELLULITIS AND ABSCESS OF LEG: Primary | ICD-10-CM

## 2019-05-31 DIAGNOSIS — R00.0 SINUS TACHYCARDIA: ICD-10-CM

## 2019-05-31 PROCEDURE — 99213 OFFICE O/P EST LOW 20 MIN: CPT | Performed by: FAMILY MEDICINE

## 2019-05-31 ASSESSMENT — MIFFLIN-ST. JEOR: SCORE: 1167.98

## 2019-05-31 NOTE — PROGRESS NOTES
Subjective     Celine Judd is a 43 year old female who presents to clinic today for the following health issues:    HPI     Insect Bite Follow-up      Duration: 05/22/2019    abx and warm soaks since 5-24-19    Description (location/character/radiation): spider bite on post Rt calf     increaseing swelling. Redness, tender ness     Intensity:  mild    Accompanying signs and symptoms: Inflamed and Draining    History (similar episodes/previous evaluation): None    Precipitating or alleviating factors: None    Therapies tried and outcome: Antibiotics, Neosporin, Peroxide, Heat, Cold and Elevation/    Did rupture on 5-26-19& is less red andtender --till almost nontender and draining less      Hypertension Follow-up      Do you check your blood pressure regularly outside of the clinic? Yes  Run 130-145/ 80's    Here are that or higher --160/90    Are you following a low salt diet? No    Are your blood pressures ever more than 140 on the top number (systolic) OR more   than 90 on the bottom number (diastolic), for example 140/90? Yes   Med: norvasc 10mgm & increase in metoprolol  From 100mgm bid to one am and 2 pm on 5-24-19    TACHYCARDIA Follow-up      - to 119 from 92 /min  --back to 112 HR today a    -now 77 o n increased B blocker     -chronic   Social History     Tobacco Use     Smoking status: Current Every Day Smoker     Packs/day: 0.25     Years: 10.00     Pack years: 2.50     Types: Cigarettes     Smokeless tobacco: Current User   Substance Use Topics     Alcohol use: Yes     Alcohol/week: 0.0 oz     Comment: 2d 3x/wk max     Drug use: Yes     Types: Marijuana     PHQ 9/6/2018 10/25/2018 5/24/2019   PHQ-9 Total Score 4 3 1   Q9: Thoughts of better off dead/self-harm past 2 weeks Not at all Not at all Not at all     NURA-7 SCORE 9/6/2018 10/25/2018 5/24/2019   Total Score 9 3 2   Suicide Assessment Five-step Evaluation and Treatment (SAFE-T)      Amount of exercise or physical activity: None    Problems  "taking medications regularly: No    Medication side effects: none    Diet: regular (no restrictions)              Reviewed and updated as needed this visit by Provider         Review of Systems   ROS COMP: CONSTITUTIONAL: NEGATIVE for fever, chills, change in weight  INTEGUMENTARY/SKIN: NEGATIVE for worrisome rashes, moles or lesions POS healing wound leg  EYES: NEGATIVE for vision changes or irritation  ENT/MOUTH: NEGATIVE for ear, mouth and throat problems  RESP: NEGATIVE for significant cough or SOB  BREAST: NEGATIVE for masses, tenderness or discharge  CV: NEGATIVE for chest pain, palpitations or peripheral edema  GI: NEGATIVE for nausea, abdominal pain, heartburn, or change in bowel habits  : NEGATIVE for frequency, dysuria, or hematuria  MUSCULOSKELETAL: NEGATIVE for significant arthralgias or myalgia  NEURO: NEGATIVE for weakness, dizziness or paresthesias  ENDOCRINE: NEGATIVE for temperature intolerance, skin/hair changes  HEME: NEGATIVE for bleeding problems  PSYCHIATRIC: NEGATIVE for changes in mood or affect      Objective    BP (!) 140/100   Pulse 112   Temp 99.1  F (37.3  C) (Tympanic)   Resp 12   Ht 1.651 m (5' 5\")   Wt 51.7 kg (114 lb)   LMP  (LMP Unknown)   SpO2 98%   Breastfeeding? No   BMI 18.97 kg/m    Body mass index is 18.97 kg/m .  Physical Exam   GENERAL: healthy, alert, no distress and thin  EYES: Eyes grossly normal to inspection, PERRL and conjunctivae and sclerae normal  RESP: lungs clear to auscultation - no rales, rhonchi or wheezes  MS: no gross musculoskeletal defects noted, no edema  SKIN: no suspicious lesions or rashes POS only very faint redness 3cm around the lesion on Rt post calf with the 8mm round cavity of the bite with some soft yellow eschar that removes easily with Q tip and only 3mm deep   NEURO: Normal strength and tone, mentation intact and speech normal  PSYCH: mentation appears normal, affect normal/bright, anxious and appearance well groomed        "     Assessment & Plan       ICD-10-CM    1. Cellulitis and abscess of leg Rt calf from spider bite 5-22-19 L03.119     L02.419    2. Benign essential hypertension-uncontrolled- after increasing meds on 5-24-19 I10    3. Sinus tachycardia-resolved on increased B blocker R00.0         Tobacco Cessation:   reports that she has been smoking cigarettes.  She has a 2.50 pack-year smoking history. She uses smokeless tobacco.  Tobacco Cessation Action Plan: Information offered: Patient not interested at this time        Patient Instructions   Cont the bid warm soaks. Cleaning out with Q tip and pressing dry       Return in about 2 weeks (around 6/14/2019) for BP Recheck, Physical Exam, PAP, wound check.    Latonia Nguyen MD  [facility]

## 2019-05-31 NOTE — NURSING NOTE
"Chief Complaint   Patient presents with     RECHECK     BP (!) 140/100   Pulse 112   Temp 99.1  F (37.3  C) (Tympanic)   Resp 12   Ht 1.651 m (5' 5\")   Wt 51.7 kg (114 lb)   LMP  (LMP Unknown)   SpO2 98%   Breastfeeding? No   BMI 18.97 kg/m   Estimated body mass index is 18.97 kg/m  as calculated from the following:    Height as of this encounter: 1.651 m (5' 5\").    Weight as of this encounter: 51.7 kg (114 lb).  BP completed using cuff size: regular   Martina Archuleta CMA    Health Maintenance Due   Topic Date Due     DIABETIC EYE EXAM  05/14/2016     PAP  09/16/2018     PREVENTIVE CARE VISIT  03/16/2019     Health Maintenance reviewed at today's visit patient asked to schedule/complete:   Routine Health Visit: Patient agrees to schedule  Cervical Cancer:  Patient agrees to schedule  Diabetes:  Patient agrees to schedule    "

## 2019-06-27 ENCOUNTER — ALLIED HEALTH/NURSE VISIT (OUTPATIENT)
Dept: NURSING | Facility: CLINIC | Age: 44
End: 2019-06-27
Payer: COMMERCIAL

## 2019-06-27 PROCEDURE — 96372 THER/PROPH/DIAG INJ SC/IM: CPT

## 2019-06-27 PROCEDURE — 99207 ZZC NO CHARGE LOS: CPT

## 2019-06-27 RX ADMIN — MEDROXYPROGESTERONE ACETATE 150 MG: 150 INJECTION, SUSPENSION INTRAMUSCULAR at 11:35

## 2019-06-27 NOTE — PROGRESS NOTES
Follow Up Injection    Patient returning during stated date range given at previous visit: Yes      If here at the correct interval:   BP Readings from Last 1 Encounters:   05/31/19 (!) 140/100     Wt Readings from Last 1 Encounters:   05/31/19 51.7 kg (114 lb)       Last Pap/exam date: 3/16/18      Side effects or problems with last injection?  No.  Date range is given to patient for next dose: september 12-26    See Medication Note for administration information    Staff Sig: Jessica Coello CMA

## 2019-09-12 ENCOUNTER — ALLIED HEALTH/NURSE VISIT (OUTPATIENT)
Dept: NURSING | Facility: CLINIC | Age: 44
End: 2019-09-12
Payer: COMMERCIAL

## 2019-09-12 PROCEDURE — 99207 ZZC NO CHARGE LOS: CPT

## 2019-09-12 PROCEDURE — 96372 THER/PROPH/DIAG INJ SC/IM: CPT

## 2019-09-12 RX ADMIN — MEDROXYPROGESTERONE ACETATE 150 MG: 150 INJECTION, SUSPENSION INTRAMUSCULAR at 14:57

## 2019-09-12 NOTE — PROGRESS NOTES
Follow Up Injection    Patient returning during stated date range given at previous visit: Yes      If here at the correct interval:   BP Readings from Last 1 Encounters:   05/31/19 (!) 140/100     Wt Readings from Last 1 Encounters:   05/31/19 51.7 kg (114 lb)     Side effects or problems with last injection?  No.  Date range is given to patient for next dose: Nov. 28,2019-Dec. 12,2019    See Medication Note for administration information    Staff Sig: Martina Archuleta CMA

## 2019-10-01 PROBLEM — Z78.9 USES BIRTH CONTROL: Status: ACTIVE | Noted: 2018-10-25

## 2019-12-05 ENCOUNTER — OFFICE VISIT (OUTPATIENT)
Dept: NURSING | Facility: CLINIC | Age: 44
End: 2019-12-05
Payer: COMMERCIAL

## 2019-12-05 DIAGNOSIS — Z78.9 USES BIRTH CONTROL: Primary | ICD-10-CM

## 2019-12-05 PROCEDURE — 96372 THER/PROPH/DIAG INJ SC/IM: CPT

## 2019-12-05 PROCEDURE — 99207 ZZC NO CHARGE LOS: CPT

## 2019-12-05 RX ADMIN — MEDROXYPROGESTERONE ACETATE 150 MG: 150 INJECTION, SUSPENSION INTRAMUSCULAR at 16:29

## 2019-12-05 NOTE — NURSING NOTE
Clinic Administered Medication Documentation      Depo Provera Documentation    Prior to injection, verified patient identity using patient's name and date of birth. Medication was administered. Please see MAR and medication order for additional information. Patient instructed to remain in clinic for 15 minutes and report any adverse reaction to staff immediately .    BP: Data Unavailable    LAST PAP/EXAM: No results found for: PAP  URINE HCG:not indicated    Was entire vial of medication used? Yes  Vial/Syringe: Single dose vial  Expiration Date:  07/2020    Martina Archuleta CMA

## 2019-12-05 NOTE — PROGRESS NOTES
Follow Up Injection    Patient returning during stated date range given at previous visit: Yes      If here at the correct interval:   BP Readings from Last 1 Encounters:   05/31/19 (!) 140/100     Wt Readings from Last 1 Encounters:   05/31/19 51.7 kg (114 lb)     Side effects or problems with last injection?  No.  Date range is given to patient for next dose: 02/20/2020-03/06/2020    See Medication Note for administration information    Staff Sig: Martina Archuleta CMA

## 2020-02-27 ENCOUNTER — ALLIED HEALTH/NURSE VISIT (OUTPATIENT)
Dept: NURSING | Facility: CLINIC | Age: 45
End: 2020-02-27
Payer: COMMERCIAL

## 2020-02-27 DIAGNOSIS — Z30.9 CONTRACEPTIVE MANAGEMENT: Primary | ICD-10-CM

## 2020-02-27 PROCEDURE — 96372 THER/PROPH/DIAG INJ SC/IM: CPT

## 2020-02-27 PROCEDURE — 99207 ZZC NO CHARGE LOS: CPT

## 2020-02-27 RX ADMIN — MEDROXYPROGESTERONE ACETATE 150 MG: 150 INJECTION, SUSPENSION INTRAMUSCULAR at 11:15

## 2020-02-27 NOTE — NURSING NOTE
Clinic Administered Medication Documentation      Depo Provera Documentation    Prior to injection, verified patient identity using patient's name and date of birth. Medication was administered. Please see MAR and medication order for additional information. Patient instructed to remain in clinic for 15 minutes, report any adverse reaction to staff immediately  and stay in clinic after the injection but patient declined.    BP: Data Unavailable    LAST PAP/EXAM: No results found for: PAP  URINE HCG:not indicated    NEXT INJECTION DUE: 5/15/2020 - 5/29/2020    Was entire vial of medication used? Yes  Vial/Syringe: Single dose vial    Martina Archuleta CMA

## 2020-05-18 ENCOUNTER — ALLIED HEALTH/NURSE VISIT (OUTPATIENT)
Dept: NURSING | Facility: CLINIC | Age: 45
End: 2020-05-18
Payer: COMMERCIAL

## 2020-05-18 DIAGNOSIS — Z30.9 CONTRACEPTIVE MANAGEMENT: Primary | ICD-10-CM

## 2020-05-18 PROCEDURE — 99207 ZZC NO CHARGE LOS: CPT

## 2020-05-18 PROCEDURE — 96372 THER/PROPH/DIAG INJ SC/IM: CPT

## 2020-05-18 RX ADMIN — MEDROXYPROGESTERONE ACETATE 150 MG: 150 INJECTION, SUSPENSION INTRAMUSCULAR at 15:17

## 2020-05-18 NOTE — PROGRESS NOTES
Follow Up Injection    Patient returning during stated date range given at previous visit: Yes      If here at the correct interval:   BP Readings from Last 1 Encounters:   05/31/19 (!) 140/100     Wt Readings from Last 1 Encounters:   05/31/19 51.7 kg (114 lb)       Last Pap/exam date: n/a      Side effects or problems with last injection?  No.  Date range is given to patient for next dose: Aug 3-7 2020    See Medication Note for administration information    Staff Sig: Jessica Coello CMA

## 2020-07-02 ENCOUNTER — VIRTUAL VISIT (OUTPATIENT)
Dept: FAMILY MEDICINE | Facility: CLINIC | Age: 45
End: 2020-07-02
Payer: COMMERCIAL

## 2020-07-02 DIAGNOSIS — R00.0 SINUS TACHYCARDIA: ICD-10-CM

## 2020-07-02 DIAGNOSIS — N18.30 CKD (CHRONIC KIDNEY DISEASE) STAGE 3, GFR 30-59 ML/MIN (H): ICD-10-CM

## 2020-07-02 DIAGNOSIS — E44.1 MILD PROTEIN-CALORIE MALNUTRITION (H): ICD-10-CM

## 2020-07-02 DIAGNOSIS — R73.02 GLUCOSE INTOLERANCE (IMPAIRED GLUCOSE TOLERANCE): ICD-10-CM

## 2020-07-02 DIAGNOSIS — E78.1 HYPERTRIGLYCERIDEMIA: ICD-10-CM

## 2020-07-02 DIAGNOSIS — F32.5 MAJOR DEPRESSION IN COMPLETE REMISSION (H): ICD-10-CM

## 2020-07-02 DIAGNOSIS — I10 BENIGN ESSENTIAL HYPERTENSION: Primary | ICD-10-CM

## 2020-07-02 DIAGNOSIS — R87.612 PAPANICOLAOU SMEAR OF CERVIX WITH LOW GRADE SQUAMOUS INTRAEPITHELIAL LESION (LGSIL): ICD-10-CM

## 2020-07-02 DIAGNOSIS — Z86.19 HISTORY OF HPV INFECTION: ICD-10-CM

## 2020-07-02 PROCEDURE — 99214 OFFICE O/P EST MOD 30 MIN: CPT | Mod: TEL | Performed by: FAMILY MEDICINE

## 2020-07-02 RX ORDER — METOPROLOL TARTRATE 100 MG
100 TABLET ORAL 2 TIMES DAILY
Qty: 180 TABLET | Refills: 0 | Status: SHIPPED | OUTPATIENT
Start: 2020-07-02 | End: 2021-02-23

## 2020-07-02 RX ORDER — AMLODIPINE BESYLATE 10 MG/1
10 TABLET ORAL DAILY
Qty: 90 TABLET | Refills: 0 | Status: SHIPPED | OUTPATIENT
Start: 2020-07-02 | End: 2021-02-23

## 2020-07-02 NOTE — PROGRESS NOTES
"Celine Judd is a 44 year old female who is being evaluated via a billable telephone visit.      The patient has been notified of following:     \"This telephone visit will be conducted via a call between you and your physician/provider. We have found that certain health care needs can be provided without the need for a physical exam.  This service lets us provide the care you need with a short phone conversation.  If a prescription is necessary we can send it directly to your pharmacy.  If lab work is needed we can place an order for that and you can then stop by our lab to have the test done at a later time.    Telephone visits are billed at different rates depending on your insurance coverage. During this emergency period, for some insurers they may be billed the same as an in-person visit.  Please reach out to your insurance provider with any questions.    If during the course of the call the physician/provider feels a telephone visit is not appropriate, you will not be charged for this service.\"    Patient has given verbal consent for Telephone visit?  Yes    What phone number would you like to be contacted at? [phone]    How would you like to obtain your AVS? Mail a copy    Subjective     Celine Judd is a 44 year old female who presents via phone visit today for the following health issues:    HPI  Hypertension Follow-up      Do you check your blood pressure regularly outside of the clinic? Yes     Are you following a low salt diet? No    Are your blood pressures ever more than 140 on the top number (systolic) OR more   than 90 on the bottom number (diastolic), for example 140/90? Yes, pt mom is sick and pt is stressed  #mother in hospice post 2 CVAs   Pt states BP was <150/85  But not taking it now with the stress  meds norvasc 10mgm q d   Lopressor 100mgm bid   NOTE: Hx of low K+ on no diuretic in past    TACHYCARDIA    -for 6 yrs  -worse with stress eg now  -has not been looking at the HR on " her BP machine   -does feel anxious      How many servings of fruits and vegetables do you eat daily?  2-3    On average, how many sweetened beverages do you drink each day (Examples: soda, juice, sweet tea, etc.  Do NOT count diet or artificially sweetened beverages)?   1    How many days per week do you exercise enough to make your heart beat faster? 3 or less    How many minutes a day do you exercise enough to make your heart beat faster? 10 - 19    How many days per week do you miss taking your medication? 0    ABNORMAL PAP     -2018 and prior : LGSIL and HPV   -over due for f/u    Glucose Intolerance   Follow-up      How often are you checking your blood sugar? Not at all    What concerns do you have today about your diabetes? None     Do you have any of these symptoms? (Select all that apply)  No numbness or tingling in feet.  No redness, sores or blisters on feet.  No complaints of excessive thirst.  No reports of blurry vision.  No significant changes to weight.    Have you had a diabetic eye exam in the last 12 months? No     No meds     + fam hx DM         BP Readings from Last 2 Encounters:   05/31/19 (!) 140/100   05/28/19 (!) 150/100     Hemoglobin A1C (%)   Date Value   03/16/2018 5.0   01/31/2017 5.3     LDL Cholesterol Calculated (mg/dL)   Date Value   03/16/2018 26   04/27/2015 77         Hypertriglyceridemia Follow-Up      Are you regularly taking any medication or supplement to lower your cholesterol?   No    Are you having muscle aches or other side effects that you think could be caused by your cholesterol lowering medication?  No     fam hx: mother sp 2 hemorrhagic CVAs    Last trig = 266 in 2018     PROTEIN CALORIE MALNUTRITION     - lifelong  - no change even with stress of mom's illnesses    Depression and Anxiety Follow-Up    How are you doing with your depression since your last visit? No change-in remission despite mother;s illnessesover 2 yrs     How are you doing with your anxiety since  your last visit?  No change    Are you having other symptoms that might be associated with depression or anxiety? No    Have you had a significant life event? OTHER: mother is termuinally ill --just put in hospice-in coma      Do you have any concerns with your use of alcohol or other drugs? No    Social History     Tobacco Use     Smoking status: Current Every Day Smoker     Packs/day: 0.25     Years: 10.00     Pack years: 2.50     Types: Cigarettes     Smokeless tobacco: Current User   Substance Use Topics     Alcohol use: Yes     Alcohol/week: 0.0 standard drinks     Comment: 2d 3x/wk max     Drug use: Yes     Types: Marijuana     PHQ 9/6/2018 10/25/2018 5/24/2019   PHQ-9 Total Score 4 3 1   Q9: Thoughts of better off dead/self-harm past 2 weeks Not at all Not at all Not at all     NURA-7 SCORE 9/6/2018 10/25/2018 5/24/2019   Total Score 9 3 2           Suicide Assessment Five-step Evaluation and Treatment (SAFE-T)    Chronic Kidney Disease 3 Follow-up      Do you take any over the counter pain medicine?: No     Comorbid glu intol, hi trigs, HTN           Reviewed and updated as needed this visit by Provider  Tobacco  Allergies  Meds  Problems  Med Hx  Surg Hx  Fam Hx         Review of Systems   CONSTITUTIONAL: NEGATIVE for fever, chills, change in weight  INTEGUMENTARY/SKIN: NEGATIVE for worrisome rashes, moles or lesions  EYES: NEGATIVE for vision changes or irritation  ENT/MOUTH: NEGATIVE for ear, mouth and throat problems  RESP: NEGATIVE for significant cough or SOB  BREAST: NEGATIVE for masses, tenderness or discharge  CV: NEGATIVE for chest pain, palpitations or peripheral edema  GI: NEGATIVE for nausea, abdominal pain, heartburn, or change in bowel habits  : NEGATIVE for frequency, dysuria, or hematuria  MUSCULOSKELETAL: NEGATIVE for significant arthralgias or myalgia  NEURO: NEGATIVE for weakness, dizziness or paresthesias  ENDOCRINE: NEGATIVE for temperature intolerance, skin/hair changes  HEME:  NEGATIVE for bleeding problems  PSYCHIATRIC: NEGATIVE for changes in mood or affect, POSITIVE for, anxiety, HX anxiety, HX depression, fatigue and stress       Objective   Reported vitals:  There were no vitals taken for this visit.   healthy, alert, no distress, mild distress, cooperative, crying and fatigued  PSYCH: Alert and oriented times 3; coherent speech, normal   rate and volume, able to articulate logical thoughts, able   to abstract reason, no tangential thoughts, no hallucinations   or delusions  Her affect is pleasant, full, anxious and tearful  RESP: No cough, no audible wheezing, able to talk in full sentences  Remainder of exam unable to be completed due to telephone visits    Diagnostic Test Results:  Labs reviewed in Epic        Assessment/Plan:      ICD-10-CM    1. Benign essential hypertension-uncontrolled- with recent stress with mother's morbid illness  I10    2. Sinus tachycardia-resolved on increased B blocker  R00.0    3. Mild protein-calorie malnutrition (H)  E44.1    4. Major depression in complete remission (H)  F32.5    5. CKD (chronic kidney disease) stage 3, GFR 30-59 ml/min (H)  N18.3    6. Hypertriglyceridemia  E78.1    7. Glucose intolerance (impaired glucose tolerance)  R73.02    8. Papanicolaou smear of cervix with low grade squamous intraepithelial lesion (LGSIL)  R87.612    9. History of HPV infection  Z86.19      Patient Instructions   1. Wait till mother is stable on hospice &/or is dead and then take and record BPs & HR  And see Dr Chowdhury in a mo for this and labs +pap& PE         Weight management plan noted, stable and monitoring    Return in about 1 month (around 8/2/2020) for Physical Exam, PAP, Lab Work, BP Recheck.      Phone call duration:  24 minutes    Latonia Nguyen MD

## 2020-07-02 NOTE — PATIENT INSTRUCTIONS
1. Wait till mother is stable on hospice &/or is dead and then take and record BPs & HR  And see Dr Chowdhury in a mo for this and labs +pap& PE

## 2020-08-06 ENCOUNTER — ALLIED HEALTH/NURSE VISIT (OUTPATIENT)
Dept: NURSING | Facility: CLINIC | Age: 45
End: 2020-08-06
Payer: COMMERCIAL

## 2020-08-06 DIAGNOSIS — Z30.9 CONTRACEPTIVE MANAGEMENT: Primary | ICD-10-CM

## 2020-08-06 PROCEDURE — 96372 THER/PROPH/DIAG INJ SC/IM: CPT

## 2020-08-06 PROCEDURE — 99207 ZZC NO CHARGE LOS: CPT

## 2020-08-06 RX ADMIN — MEDROXYPROGESTERONE ACETATE 150 MG: 150 INJECTION, SUSPENSION INTRAMUSCULAR at 11:21

## 2020-08-06 NOTE — PROGRESS NOTES
Follow Up Injection  YesPatient returning during stated date range given at previous visit: Yes      If here at the correct interval:   BP Readings from Last 1 Encounters:   05/31/19 (!) 140/100     Wt Readings from Last 1 Encounters:   05/31/19 51.7 kg (114 lb)       Last Pap/exam date: n/a      Side effects or problems with last injection?  No.  Date range is given to patient for next dose: October 22 - November 5 2020    See Medication Note for administration information    Staff Sig: Jessica Coello CMA

## 2020-10-23 ENCOUNTER — ALLIED HEALTH/NURSE VISIT (OUTPATIENT)
Dept: NURSING | Facility: CLINIC | Age: 45
End: 2020-10-23
Payer: COMMERCIAL

## 2020-10-23 DIAGNOSIS — Z30.9 CONTRACEPTIVE MANAGEMENT: Primary | ICD-10-CM

## 2020-10-23 PROCEDURE — 96372 THER/PROPH/DIAG INJ SC/IM: CPT

## 2020-10-23 PROCEDURE — 99207 PR NO CHARGE NURSE ONLY: CPT

## 2020-10-23 RX ADMIN — MEDROXYPROGESTERONE ACETATE 150 MG: 150 INJECTION, SUSPENSION INTRAMUSCULAR at 14:42

## 2021-02-23 ENCOUNTER — OFFICE VISIT (OUTPATIENT)
Dept: FAMILY MEDICINE | Facility: CLINIC | Age: 46
End: 2021-02-23
Payer: COMMERCIAL

## 2021-02-23 VITALS
SYSTOLIC BLOOD PRESSURE: 170 MMHG | BODY MASS INDEX: 18.23 KG/M2 | TEMPERATURE: 97.4 F | RESPIRATION RATE: 14 BRPM | HEART RATE: 90 BPM | WEIGHT: 113.4 LBS | HEIGHT: 66 IN | OXYGEN SATURATION: 99 % | DIASTOLIC BLOOD PRESSURE: 100 MMHG

## 2021-02-23 DIAGNOSIS — Z23 NEED FOR DIPHTHERIA-TETANUS-PERTUSSIS (TDAP) VACCINE: ICD-10-CM

## 2021-02-23 DIAGNOSIS — N90.89 VULVAR LESION: ICD-10-CM

## 2021-02-23 DIAGNOSIS — Z12.11 SPECIAL SCREENING FOR MALIGNANT NEOPLASMS, COLON: ICD-10-CM

## 2021-02-23 DIAGNOSIS — E44.1 MILD PROTEIN-CALORIE MALNUTRITION (H): ICD-10-CM

## 2021-02-23 DIAGNOSIS — Z00.00 ENCOUNTER FOR ROUTINE ADULT HEALTH EXAMINATION WITHOUT ABNORMAL FINDINGS: Primary | ICD-10-CM

## 2021-02-23 DIAGNOSIS — R87.613 HSIL (HIGH GRADE SQUAMOUS INTRAEPITHELIAL LESION) ON PAP SMEAR OF CERVIX: ICD-10-CM

## 2021-02-23 DIAGNOSIS — Z12.4 SCREENING FOR MALIGNANT NEOPLASM OF CERVIX: ICD-10-CM

## 2021-02-23 DIAGNOSIS — Z12.31 ENCOUNTER FOR SCREENING MAMMOGRAM FOR BREAST CANCER: ICD-10-CM

## 2021-02-23 DIAGNOSIS — F33.2 SEVERE EPISODE OF RECURRENT MAJOR DEPRESSIVE DISORDER, WITHOUT PSYCHOTIC FEATURES (H): ICD-10-CM

## 2021-02-23 DIAGNOSIS — F43.21 GRIEF REACTION: ICD-10-CM

## 2021-02-23 DIAGNOSIS — I10 BENIGN ESSENTIAL HYPERTENSION: ICD-10-CM

## 2021-02-23 PROCEDURE — 99396 PREV VISIT EST AGE 40-64: CPT | Mod: 25 | Performed by: PHYSICIAN ASSISTANT

## 2021-02-23 PROCEDURE — 90471 IMMUNIZATION ADMIN: CPT | Performed by: PHYSICIAN ASSISTANT

## 2021-02-23 PROCEDURE — 80053 COMPREHEN METABOLIC PANEL: CPT | Performed by: PHYSICIAN ASSISTANT

## 2021-02-23 PROCEDURE — 36415 COLL VENOUS BLD VENIPUNCTURE: CPT | Performed by: PHYSICIAN ASSISTANT

## 2021-02-23 PROCEDURE — 83721 ASSAY OF BLOOD LIPOPROTEIN: CPT | Mod: 59 | Performed by: PHYSICIAN ASSISTANT

## 2021-02-23 PROCEDURE — 96372 THER/PROPH/DIAG INJ SC/IM: CPT | Performed by: PHYSICIAN ASSISTANT

## 2021-02-23 PROCEDURE — 90715 TDAP VACCINE 7 YRS/> IM: CPT | Performed by: PHYSICIAN ASSISTANT

## 2021-02-23 PROCEDURE — 87624 HPV HI-RISK TYP POOLED RSLT: CPT | Performed by: PHYSICIAN ASSISTANT

## 2021-02-23 PROCEDURE — G0145 SCR C/V CYTO,THINLAYER,RESCR: HCPCS | Performed by: PHYSICIAN ASSISTANT

## 2021-02-23 PROCEDURE — 99214 OFFICE O/P EST MOD 30 MIN: CPT | Mod: 25 | Performed by: PHYSICIAN ASSISTANT

## 2021-02-23 PROCEDURE — 80061 LIPID PANEL: CPT | Performed by: PHYSICIAN ASSISTANT

## 2021-02-23 PROCEDURE — G0124 SCREEN C/V THIN LAYER BY MD: HCPCS | Performed by: PATHOLOGY

## 2021-02-23 RX ORDER — AMLODIPINE BESYLATE 10 MG/1
10 TABLET ORAL DAILY
Qty: 90 TABLET | Refills: 0 | Status: SHIPPED | OUTPATIENT
Start: 2021-02-23 | End: 2021-06-08

## 2021-02-23 RX ORDER — METOPROLOL TARTRATE 100 MG
100 TABLET ORAL 2 TIMES DAILY
Qty: 180 TABLET | Refills: 0 | Status: SHIPPED | OUTPATIENT
Start: 2021-02-23 | End: 2022-08-01

## 2021-02-23 RX ADMIN — MEDROXYPROGESTERONE ACETATE 150 MG: 150 INJECTION, SUSPENSION INTRAMUSCULAR at 18:27

## 2021-02-23 ASSESSMENT — PATIENT HEALTH QUESTIONNAIRE - PHQ9: SUM OF ALL RESPONSES TO PHQ QUESTIONS 1-9: 1

## 2021-02-23 ASSESSMENT — MIFFLIN-ST. JEOR: SCORE: 1163.19

## 2021-02-23 NOTE — PROGRESS NOTES
SUBJECTIVE:   CC: Celine Judd is an 45 year old woman who presents for preventive health visit.     {  Patient has been advised of split billing requirements and indicates understanding: Yes  Healthy Habits:    Getting at least 3 servings of Calcium per day:  Yes    Bi-annual eye exam:  NO    Dental care twice a year:  Yes    Sleep apnea or symptoms of sleep apnea:  None    Diet:  Regular (no restrictions) and Low salt    Frequency of exercise:  1 day/week    Duration of exercise:  30-45 minutes    Taking medications regularly:  No (prescription )    Barriers to taking medications:  None    Medication side effects:  Other (cough from Lisinopril)    PHQ-2 Total Score:    Additional concerns today:  Yes (anxiety and job stressors)          Today's PHQ-2 Score:   PHQ-2 (  [employer]) 2019   Q1: Little interest or pleasure in doing things 0   Q2: Feeling down, depressed or hopeless 0   PHQ-2 Score 0   Q1: Little interest or pleasure in doing things -   Q2: Feeling down, depressed or hopeless -   PHQ-2 Score -       Abuse: Current or Past (Physical, Sexual or Emotional) - No  Do you feel safe in your environment? Yes    Have you ever done Advance Care Planning? (For example, a Health Directive, POLST, or a discussion with a medical provider or your loved ones about your wishes): No, advance care planning information given to patient to review.  Patient declined advance care planning discussion at this time.    Social History     Tobacco Use     Smoking status: Current Every Day Smoker     Packs/day: 0.25     Years: 10.00     Pack years: 2.50     Types: Cigarettes     Smokeless tobacco: Current User   Substance Use Topics     Alcohol use: Yes     Alcohol/week: 0.0 standard drinks     Comment: 2d 3x/wk max     If you drink alcohol do you typically have >3 drinks per day or >7 drinks per week? Yes      Any new diagnosis of family breast, ovarian, or bowel cancer? Yes Breast cancer    Reviewed orders  with patient.  Reviewed health maintenance and updated orders accordingly - Yes  BP Readings from Last 3 Encounters:   02/23/21 (!) 170/100   05/31/19 (!) 140/100   05/28/19 (!) 150/100    Wt Readings from Last 3 Encounters:   02/23/21 51.4 kg (113 lb 6.4 oz)   05/31/19 51.7 kg (114 lb)   05/28/19 51.7 kg (114 lb)                  Patient Active Problem List   Diagnosis     Allergic rhinitis     LGSIL pap with + HR HPV     Benign essential hypertension     Hypertriglyceridemia     Major depression in complete remission (H) on meds since 4-24-15; onset 2011     Glucose intolerance (impaired glucose tolerance)     Family history of diabetes mellitus     Protein-calorie malnutrition (H)     Gastroesophageal reflux disease without esophagitis     Impaired fasting glucose     Moderate episode of recurrent major depressive disorder (H) w mom's Dx ca     Mild protein-calorie malnutrition (H)     Sinus tachycardia-resolved on increased B blocker     Chronic allergic rhinitis, unspecified seasonality, unspecified trigger     Hypokalemia by hx -=on supplements      Sebaceous cyst-infected--I&D done 9-6-18a     Tobacco use disorder: [age]y/o (3-13) @ 1/4 ppd=3-4 pk yr hx and continues at 1/5 ppd since      Contraception-depo f/u      Depo-Provera contraceptive status     Cellulitis and abscess of leg Rt calf from spider bite 5-22-19     Past Surgical History:   Procedure Laterality Date     NO HISTORY OF SURGERY         Social History     Tobacco Use     Smoking status: Current Every Day Smoker     Packs/day: 0.25     Years: 10.00     Pack years: 2.50     Types: Cigarettes     Smokeless tobacco: Current User   Substance Use Topics     Alcohol use: Yes     Alcohol/week: 0.0 standard drinks     Comment: 2d 3x/wk max     Family History   Problem Relation Age of Onset     Glaucoma Mother 61     Hypertension Mother 55     Breast Cancer Mother      Cancer Father 47        lung      No Known Problems Brother      No Known Problems  Son      No Known Problems Brother      No Known Problems Brother      No Known Problems Son      Diabetes Maternal Grandmother 65     No Known Problems Maternal Grandfather      No Known Problems Paternal Grandmother      No Known Problems Paternal Grandfather      No Known Problems Sister      No Known Problems Daughter      No Known Problems Maternal Half-Brother      No Known Problems Maternal Half-Sister      No Known Problems Paternal Half-Brother      No Known Problems Paternal Half-Sister      No Known Problems Niece      No Known Problems Nephew      No Known Problems Cousin      No Known Problems Other      Coronary Artery Disease No family hx of      Cancer - colorectal No family hx of      Ovarian Cancer No family hx of      Prostate Cancer No family hx of      Depression/Anxiety No family hx of      Thyroid Disease No family hx of      Chemical Addiction No family hx of      Hyperlipidemia No family hx of      Cerebrovascular Disease No family hx of      Colon Cancer No family hx of      Other Cancer No family hx of      Depression No family hx of      Anxiety Disorder No family hx of      Mental Illness No family hx of      Substance Abuse No family hx of      Anesthesia Reaction No family hx of      Asthma No family hx of      Osteoporosis No family hx of      Genetic Disorder No family hx of      Obesity No family hx of      Unknown/Adopted No family hx of          Current Outpatient Medications   Medication Sig Dispense Refill     acetaminophen (TYLENOL) 500 MG tablet Take 2 tablets po q 6 hr prn pain 100 tablet 1     amLODIPine (NORVASC) 10 MG tablet Take 1 tablet (10 mg) by mouth daily 90 tablet 0     cholecalciferol (VITAMIN D3) 5000 UNITS CAPS capsule Take 1 capsule (5,000 Units) by mouth daily 90 capsule 0     DiphenhydrAMINE HCl (BENADRYL PO)        loratadine (CLARITIN) 10 MG tablet Take 1 tablet (10 mg) by mouth daily 90 tablet 1     medroxyPROGESTERone (DEPO-PROVERA) 150 MG/ML injection  "Inject 1 mL (150 mg) into the muscle every 3 months 1 mL 0     metoprolol tartrate (LOPRESSOR) 100 MG tablet Take 1 tablet (100 mg) by mouth 2 times daily 180 tablet 0     naproxen (NAPROSYN) 500 MG tablet Take 1 tablet (500 mg) by mouth 2 times daily (with meals) (Patient not taking: Reported on 2/23/2021) 60 tablet 1     potassium chloride SA (K-DUR,KLOR-CON M) 10 MEQ tablet Take 1 tablet (10 mEq) by mouth 2 times daily (Patient not taking: Reported on 2/23/2021) 20 tablet 0     ranitidine (ZANTAC) 150 MG tablet Take 1 tablet (150 mg) by mouth 2 times daily (Patient not taking: Reported on 2/23/2021) 180 tablet 2     Allergies   Allergen Reactions     Lisinopril Swelling     Lip Angioedema       Breast CA Risk Screening:  No flowsheet data found.    Mammogram Screening: Recommended annual mammography  Pertinent mammograms are reviewed under the imaging tab.    History of abnormal Pap smear:   PAP / HPV Latest Ref Rng & Units 2/23/2021 3/16/2018 10/27/2016   PAP - HSIL(A) NIL ASC-H(A)   HPV 16 DNA NEG:Negative - Negative Negative   HPV 18 DNA NEG:Negative - Negative Negative   OTHER HR HPV NEG:Negative - Positive(A) Positive(A)     Reviewed and updated as needed this visit by clinical staff  Tobacco  Allergies  Meds      Soc Hx        Reviewed and updated as needed this visit by Provider                    Review of Systems   Constitutional: Negative.    HENT: Negative.    Eyes: Negative.    Respiratory: Negative.    Cardiovascular: Negative.    Gastrointestinal: Negative.    Musculoskeletal: Negative.    Skin: Negative.    Neurological: Negative.    Psychiatric/Behavioral:        As in HPI          OBJECTIVE:   BP (!) 170/100 (BP Location: Right arm, Patient Position: Sitting, Cuff Size: Adult Regular)   Pulse 90   Temp 97.4  F (36.3  C) (Tympanic)   Resp 14   Ht 1.664 m (5' 5.5\")   Wt 51.4 kg (113 lb 6.4 oz)   SpO2 99%   BMI 18.58 kg/m    Physical Exam  Constitutional:       General: She is not in acute " distress.     Appearance: She is well-developed.   HENT:      Right Ear: Tympanic membrane and external ear normal.      Left Ear: Tympanic membrane and external ear normal.      Nose: Nose normal.      Mouth/Throat:      Pharynx: No oropharyngeal exudate.   Eyes:      General:         Right eye: No discharge.         Left eye: No discharge.      Conjunctiva/sclera: Conjunctivae normal.      Pupils: Pupils are equal, round, and reactive to light.   Neck:      Musculoskeletal: Neck supple.      Thyroid: No thyromegaly.      Trachea: No tracheal deviation.   Cardiovascular:      Rate and Rhythm: Normal rate and regular rhythm.      Pulses: Normal pulses.      Heart sounds: Normal heart sounds, S1 normal and S2 normal. No murmur. No friction rub. No S3 or S4 sounds.    Pulmonary:      Effort: Pulmonary effort is normal. No respiratory distress.      Breath sounds: Normal breath sounds. No wheezing or rales.   Chest:      Breasts:         Right: No mass, nipple discharge or tenderness.         Left: No mass, nipple discharge or tenderness.   Abdominal:      Palpations: Abdomen is soft. There is no mass.      Tenderness: There is no abdominal tenderness.   Genitourinary:     Labia:         Right: Lesion present.         Left: Lesion present.       Vagina: Normal.      Cervix: No cervical motion tenderness or discharge.   Musculoskeletal: Normal range of motion.   Lymphadenopathy:      Cervical: No cervical adenopathy.   Skin:     General: Skin is warm and dry.      Findings: No rash.   Neurological:      Mental Status: She is alert and oriented to person, place, and time.      Motor: No abnormal muscle tone.   Psychiatric:         Thought Content: Thought content normal.         Judgment: Judgment normal.           Diagnostic Test Results:  Labs reviewed in Epic  Results for orders placed or performed in visit on 02/23/21   Pap imaged thin layer screen with HPV - recommended age 30 - 65 years (select HPV order below)      Status: Abnormal   Result Value Ref Range    PAP HSIL (A)     Copath Report         Patient Name: VIOLET BORDEN  MR#: [id number]  Specimen #: [id number]  Collected: 2/23/2021  Received: 2/25/2021  Reported: 3/1/2021 20:03  Ordering Phy(s): CM KELLY    For improved result formatting, select 'View Enhanced Report Format' under   Linked Documents section.    SPECIMEN/STAIN PROCESS:  Pap imaged thin layer prep screening (Surepath, FocalPoint with guided   screening)       Pap-Cyto x 1, HPV ordered x 1    SOURCE: Cervical, endocervical  ----------------------------------------------------------------   Pap imaged thin layer prep screening (Surepath, FocalPoint with guided   screening)  SPECIMEN ADEQUACY:  Satisfactory for evaluation.  -Transformation zone component present.    CYTOLOGIC INTERPRETATION:    Epithelial cell abnormality:  Squamous Cell:  High-grade squamous   intraepithelial lesion (HSIL)         Other Non-Neoplastic Findings:  Reactive cellular changes associated with:    I have reviewed this specimen and edited this report    Electronica lly signed out by:  Zach Coles M.D.    CLINICAL HISTORY:    Injection, A previous normal pap  Date of Last Pap: 3/16/2018,    Papanicolaou Test Limitations:  Cervical cytology is a screening test with   limited sensitivity; regular  screening is critical for cancer prevention; Pap tests are primarily   effective for the diagnosis/prevention of  squamous cell carcinoma, not adenocarcinomas or other cancers.    COLLECTION SITE:  Client:  [facility]  Location: [facility] (S)    The technical component of this testing was completed at the [facility], with the professional component performed   at the [facility], [address] [place] [phone] ([phone])       Lipid panel reflex to direct LDL Fasting     Status: Abnormal   Result Value Ref  Range    Cholesterol 191 <200 mg/dL    Triglycerides 420 (H) <150 mg/dL    HDL Cholesterol 51 >49 mg/dL    LDL Cholesterol Calculated  <100 mg/dL     Cannot estimate LDL when triglyceride exceeds 400 mg/dL    Non HDL Cholesterol 140 (H) <130 mg/dL   Comprehensive metabolic panel (BMP + Alb, Alk Phos, ALT, AST, Total. Bili, TP)     Status: Abnormal   Result Value Ref Range    Sodium 136 133 - 144 mmol/L    Potassium 3.3 (L) 3.4 - 5.3 mmol/L    Chloride 103 94 - 109 mmol/L    Carbon Dioxide 24 20 - 32 mmol/L    Anion Gap 9 3 - 14 mmol/L    Glucose 99 70 - 99 mg/dL    Urea Nitrogen 17 7 - 30 mg/dL    Creatinine 0.88 0.52 - 1.04 mg/dL    GFR Estimate 78 >60 mL/min/[1.73_m2]    GFR Estimate If Black >90 >60 mL/min/[1.73_m2]    Calcium 10.2 (H) 8.5 - 10.1 mg/dL    Bilirubin Total 1.1 0.2 - 1.3 mg/dL    Albumin 4.3 3.4 - 5.0 g/dL    Protein Total 8.4 6.8 - 8.8 g/dL    Alkaline Phosphatase 100 40 - 150 U/L    ALT 40 0 - 50 U/L    AST 28 0 - 45 U/L   LDL cholesterol direct     Status: None   Result Value Ref Range    LDL Cholesterol Direct 75 <100 mg/dL       ASSESSMENT/PLAN:       ICD-10-CM    1. Encounter for routine adult health examination without abnormal findings  Z00.00 Lipid panel reflex to direct LDL Fasting     Comprehensive metabolic panel (BMP + Alb, Alk Phos, ALT, AST, Total. Bili, TP)     LDL cholesterol direct   2. Mild protein-calorie malnutrition (H)  E44.1    3. Severe episode of recurrent major depressive disorder, without psychotic features (H)  F33.2    4. Grief reaction  F43.21 MENTAL HEALTH REFERRAL  - Adult; Outpatient Treatment; Individual/Couples/Family/Group Therapy/Health Psychology; [facility] [place] [phone]; We will contact you to schedule the appointment or please call with any questions     CARE COORDINATION REFERRAL   5. Screening for malignant neoplasm of cervix  Z12.4 Pap imaged thin layer screen with HPV - recommended age 30 - 65 years (select HPV order below)     HPV  "High Risk Types DNA Cervical   6. Encounter for screening mammogram for breast cancer  Z12.31 MA Screen Bilateral w/Sudeep   7. Special screening for malignant neoplasms, colon  Z12.11 GASTROENTEROLOGY ADULT REF PROCEDURE ONLY   8. Benign essential hypertension  I10 amLODIPine (NORVASC) 10 MG tablet     metoprolol tartrate (LOPRESSOR) 100 MG tablet   9. Need for diphtheria-tetanus-pertussis (Tdap) vaccine  Z23 TDAP VACCINE (Adacel, Boostrix)  [[id number]]   10. Vulvar lesion  N90.89 SKIN CARE REFERRAL   11. HSIL (high grade squamous intraepithelial lesion) on Pap smear of cervix  R87.613         - preventive care as above  - HSIL (waiting for HPV result at the time of documentation) - follow up as directed by Pap pool  - HTN - uncontrolled today.  Medications refilled (she has been out) - recheck in 3 weeks.   - vulvar cyst lesions - referral to skin care     - grief and depression.  Mother passed away this past year, patient work environment closed and she is now working from home, work is very stressful.     Return in about 3 weeks (around 3/16/2021) for Blood Pressure Recheck.      Patient has been advised of split billing requirements and indicates understanding: Yes  COUNSELING:  Reviewed preventive health counseling, as reflected in patient instructions    Estimated body mass index is 18.58 kg/m  as calculated from the following:    Height as of this encounter: 1.664 m (5' 5.5\").    Weight as of this encounter: 51.4 kg (113 lb 6.4 oz).        She reports that she has been smoking cigarettes. She has a 2.50 pack-year smoking history. She uses smokeless tobacco.  Tobacco Cessation Action Plan:   not discussed today - due to increased stress and depression - plan to revisit in the future.       Counseling Resources:  ATP IV Guidelines  Pooled Cohorts Equation Calculator  Breast Cancer Risk Calculator  BRCA-Related Cancer Risk Assessment: FHS-7 Tool  FRAX Risk Assessment  ICSI Preventive Guidelines  Dietary Guidelines " for Americans, 2010  USDA's MyPlate  ASA Prophylaxis  Lung CA Screening    CINDY Baird [facility]

## 2021-02-23 NOTE — LETTER
March 1, 2021      Celine Judd  [address]  [place] [address]        Dear ,    We are writing to inform you of your test results.    - Triglycerides are high.  These can be elevated when you are not fasting.  To improve triglycerides, I recommend cutting back on sugars, breads, and pastas.  You may also take fish oil supplements.   Fish oil supplements:  The current recommendation is 1,000 mg daily.  There is evidence this decreases risk of cardiovascular disease and improves triglycerides.  If you eat 2 servings a week of fatty fish (tuna, salmon, halibut, trout) - you do not need to supplement.     - Your potassium is slightly low, make sure you continue taking the potassium supplement.   Add some of these foods into your diet:  Foods high in potassium:  - baked potatoes with skin  - black beans  - banana  - raw carrots  - broccoli  - cantaloupe  - plain, low fat yogurt  - milk     All other labs look good.   At this time of this letter, the Pap and HPV results are still pending.       Resulted Orders   Lipid panel reflex to direct LDL Fasting   Result Value Ref Range    Cholesterol 191 <200 mg/dL    Triglycerides 420 (H) <150 mg/dL      Comment:      Borderline high:  150-199 mg/dl  High:             200-499 mg/dl  Very high:       >499 mg/dl  Non Fasting      HDL Cholesterol 51 >49 mg/dL    LDL Cholesterol Calculated  <100 mg/dL     Cannot estimate LDL when triglyceride exceeds 400 mg/dL    Non HDL Cholesterol 140 (H) <130 mg/dL      Comment:      Above Desirable:  130-159 mg/dl  Borderline high:  160-189 mg/dl  High:             190-219 mg/dl  Very high:       >219 mg/dl     Comprehensive metabolic panel (BMP + Alb, Alk Phos, ALT, AST, Total. Bili, TP)   Result Value Ref Range    Sodium 136 133 - 144 mmol/L    Potassium 3.3 (L) 3.4 - 5.3 mmol/L    Chloride 103 94 - 109 mmol/L    Carbon Dioxide 24 20 - 32 mmol/L    Anion Gap 9 3 - 14 mmol/L    Glucose 99 70 - 99 mg/dL      Comment:       Non Fasting    Urea Nitrogen 17 7 - 30 mg/dL    Creatinine 0.88 0.52 - 1.04 mg/dL    GFR Estimate 78 >60 mL/min/[1.73_m2]      Comment:      Non  GFR Calc  Starting 12/18/2018, serum creatinine based estimated GFR (eGFR) will be   calculated using the Chronic Kidney Disease Epidemiology Collaboration   (CKD-EPI) equation.      GFR Estimate If Black >90 >60 mL/min/[1.73_m2]      Comment:       GFR Calc  Starting 12/18/2018, serum creatinine based estimated GFR (eGFR) will be   calculated using the Chronic Kidney Disease Epidemiology Collaboration   (CKD-EPI) equation.      Calcium 10.2 (H) 8.5 - 10.1 mg/dL    Bilirubin Total 1.1 0.2 - 1.3 mg/dL    Albumin 4.3 3.4 - 5.0 g/dL    Protein Total 8.4 6.8 - 8.8 g/dL    Alkaline Phosphatase 100 40 - 150 U/L    ALT 40 0 - 50 U/L    AST 28 0 - 45 U/L   LDL cholesterol direct   Result Value Ref Range    LDL Cholesterol Direct 75 <100 mg/dL      Comment:      Desirable:       <100 mg/dl       If you have any questions or concerns, please call the clinic at the number listed above.       Sincerely,      Jerilyn Mobley PA-C

## 2021-02-24 ENCOUNTER — PATIENT OUTREACH (OUTPATIENT)
Dept: CARE COORDINATION | Facility: CLINIC | Age: 46
End: 2021-02-24

## 2021-02-24 LAB
ALBUMIN SERPL-MCNC: 4.3 G/DL (ref 3.4–5)
ALP SERPL-CCNC: 100 U/L (ref 40–150)
ALT SERPL W P-5'-P-CCNC: 40 U/L (ref 0–50)
ANION GAP SERPL CALCULATED.3IONS-SCNC: 9 MMOL/L (ref 3–14)
AST SERPL W P-5'-P-CCNC: 28 U/L (ref 0–45)
BILIRUB SERPL-MCNC: 1.1 MG/DL (ref 0.2–1.3)
BUN SERPL-MCNC: 17 MG/DL (ref 7–30)
CALCIUM SERPL-MCNC: 10.2 MG/DL (ref 8.5–10.1)
CHLORIDE SERPL-SCNC: 103 MMOL/L (ref 94–109)
CHOLEST SERPL-MCNC: 191 MG/DL
CO2 SERPL-SCNC: 24 MMOL/L (ref 20–32)
CREAT SERPL-MCNC: 0.88 MG/DL (ref 0.52–1.04)
GFR SERPL CREATININE-BSD FRML MDRD: 78 ML/MIN/{1.73_M2}
GLUCOSE SERPL-MCNC: 99 MG/DL (ref 70–99)
HDLC SERPL-MCNC: 51 MG/DL
LDLC SERPL CALC-MCNC: ABNORMAL MG/DL
LDLC SERPL DIRECT ASSAY-MCNC: 75 MG/DL
NONHDLC SERPL-MCNC: 140 MG/DL
POTASSIUM SERPL-SCNC: 3.3 MMOL/L (ref 3.4–5.3)
PROT SERPL-MCNC: 8.4 G/DL (ref 6.8–8.8)
SODIUM SERPL-SCNC: 136 MMOL/L (ref 133–144)
TRIGL SERPL-MCNC: 420 MG/DL

## 2021-02-24 NOTE — PROGRESS NOTES
Clinic Care Coordination Contact  [facility]/Voicemail    Referral Source: PCP  Clinical Data: Care Coordinator Outreach    Outreach attempted x 1.  Left message on patient's voicemail with call back information and requested return call.    Plan: Care Coordinator will try to reach patient again in 1-2 business days.    Ann-Marie Mosley [facility]  Clinic Care Coordinator  [facility] Cinthia Pe[place]±uelas  [facility]  [phone]  [email or web address]

## 2021-02-26 ENCOUNTER — PATIENT OUTREACH (OUTPATIENT)
Dept: CARE COORDINATION | Facility: CLINIC | Age: 46
End: 2021-02-26

## 2021-02-26 NOTE — LETTER
[facility] CARE COORDINATION  [address] [place] [address]    February 26, 2021    Celine Judd  [address]  [place] [address]      Dear Celine,    I am a clinic care coordinator who works with Jerilyn Mobley PA-C at [facility]. I wanted to introduce myself and provide you with my contact information for you to be able to call me with any questions or concerns. Below is a description of clinic care coordination and how I can further assist you.      The clinic care coordination team is made up of a registered nurse,  and community health worker who understand the health care system. The goal of clinic care coordination is to help you manage your health and improve access to the health care system in the most efficient manner. The team can assist you in meeting your health care goals by providing education, coordinating services, strengthening the communication among your providers and supporting you with any resource needs.    Please feel free to contact me at [phone] with any questions or concerns. We are focused on providing you with the highest-quality healthcare experience possible and that all starts with you.     Sincerely,     JAQUI Chaudhari

## 2021-02-26 NOTE — PROGRESS NOTES
Clinic Care Coordination Contact  [facility]/Voicemail       Clinical Data: Care Coordinator Outreach    Outreach attempted x 2.  Left message on patient's voicemail with call back information and requested return call.    Plan: Care Coordinator will send care coordination introduction letter with care coordinator contact information and explanation of care coordination services via mail. Care Coordinator will do no further outreaches at this time.    Ann-Marie Mosley [facility]  Clinic Care Coordinator  [facility] Cinthia Vance  [facility]  [phone]  [email or web address]

## 2021-03-01 LAB
COPATH REPORT: ABNORMAL
PAP: ABNORMAL

## 2021-03-02 PROBLEM — N18.30 CKD (CHRONIC KIDNEY DISEASE) STAGE 3, GFR 30-59 ML/MIN (H): Status: RESOLVED | Noted: 2019-05-24 | Resolved: 2021-03-02

## 2021-03-02 ASSESSMENT — ENCOUNTER SYMPTOMS
CONSTITUTIONAL NEGATIVE: 1
NEUROLOGICAL NEGATIVE: 1
EYES NEGATIVE: 1
GASTROINTESTINAL NEGATIVE: 1
RESPIRATORY NEGATIVE: 1
MUSCULOSKELETAL NEGATIVE: 1
CARDIOVASCULAR NEGATIVE: 1

## 2021-03-04 ENCOUNTER — PATIENT OUTREACH (OUTPATIENT)
Dept: FAMILY MEDICINE | Facility: CLINIC | Age: 46
End: 2021-03-04

## 2021-03-04 DIAGNOSIS — E87.6 HYPOKALEMIA: ICD-10-CM

## 2021-03-04 DIAGNOSIS — R87.613 HSIL (HIGH GRADE SQUAMOUS INTRAEPITHELIAL LESION) ON PAP SMEAR OF CERVIX: ICD-10-CM

## 2021-03-04 RX ORDER — POTASSIUM CHLORIDE 750 MG/1
10 TABLET, EXTENDED RELEASE ORAL 2 TIMES DAILY
Qty: 20 TABLET | Refills: 0 | Status: SHIPPED | OUTPATIENT
Start: 2021-03-04 | End: 2021-06-21

## 2021-03-04 NOTE — LETTER
May 25, 2021      Celine Judd  [address]  [place] [address]        Dear ,    At [facility], your health and wellness are our primary concern. That is why we are following up on your most recent abnormal Pap smear.    Please call [phone] to schedule an appointment for your recommended follow-up LEEP (this cannot be scheduled through [facility]) at your earliest convenience.      If you have completed the appointment outside of the [facility] system, please have the records forwarded to our office. We will update your chart for your provider to review before your next annual wellness visit.     Thank you for choosing [facility]!      Sincerely,    Your [facility] Care Team

## 2021-03-08 ENCOUNTER — OFFICE VISIT (OUTPATIENT)
Dept: FAMILY MEDICINE | Facility: CLINIC | Age: 46
End: 2021-03-08
Payer: COMMERCIAL

## 2021-03-08 ENCOUNTER — HOSPITAL ENCOUNTER (OUTPATIENT)
Dept: MAMMOGRAPHY | Facility: CLINIC | Age: 46
Discharge: HOME | End: 2021-03-08
Attending: PHYSICIAN ASSISTANT | Admitting: PHYSICIAN ASSISTANT
Payer: COMMERCIAL

## 2021-03-08 VITALS — SYSTOLIC BLOOD PRESSURE: 122 MMHG | DIASTOLIC BLOOD PRESSURE: 76 MMHG

## 2021-03-08 DIAGNOSIS — L72.3 SEBACEOUS CYST: Primary | ICD-10-CM

## 2021-03-08 DIAGNOSIS — Z12.31 ENCOUNTER FOR SCREENING MAMMOGRAM FOR BREAST CANCER: ICD-10-CM

## 2021-03-08 PROCEDURE — 99212 OFFICE O/P EST SF 10 MIN: CPT | Performed by: FAMILY MEDICINE

## 2021-03-08 PROCEDURE — 77063 BREAST TOMOSYNTHESIS BI: CPT

## 2021-03-08 NOTE — PROGRESS NOTES
[facility] - PRIMARY CARE SKIN    CC: lesions  SUBJECTIVE:   Celine Judd is a(n) 45 year old female who presents to clinic today      Issue one : vaginal bumps that have been present for years. No itchy or burning . She is not sexually active.    Personal Medical History  Skin Cancer: NO  Eczema Psoriasis Lupus   NO NO NO   Other:   .    Family Medical History  Skin Cancer: NO  Eczema Psoriasis Lupus   NO NO mother   Other:       Occupation: [place] [employer]    Refer to electronic medical record (EMR) for past medical history and medications.      ROS: 14 point review of systems was negative except the symptoms listed above in the HPI.      OBJECTIVE:   GENERAL: healthy, alert and no distress.  HEENT: PERRL. Conjunctiva, sclera clear.  SKIN: [place]ian Skin Type - V.  Groin examined. The dermatoscope was used to help evaluate pigmented lesions.  Skin Pertinent Findings:  Labia - scattered, smooth, epidermal lesions, white - consistent with cyst. Vary in size 5 mm- 10 mm      Diagnostic Test Results:  none     ASSESSMENT:     Encounter Diagnosis   Name Primary?     Sebaceous cyst Yes     MDM: discussed benign nature , options are to observe or incision    PLAN:   Patient Instructions   Recheck if needed

## 2021-03-08 NOTE — LETTER
3/8/2021         RE: Celine Judd  [address]  [facility] [address]        Dear Colleague,    Thank you for referring your patient, Celine Judd, to the [facility]. Please see a copy of my visit note below.    [facility] - PRIMARY CARE SKIN    CC: lesions  SUBJECTIVE:   Celine Judd is a(n) 45 year old female who presents to clinic today      Issue one : vaginal bumps that have been present for years. No itchy or burning . She is not sexually active.    Personal Medical History  Skin Cancer: NO  Eczema Psoriasis Lupus   NO NO NO   Other:   .    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      ROS: 14 point review of systems was negative except the symptoms listed above in the HPI.      OBJECTIVE:   GENERAL: healthy, alert and no distress.  HEENT: PERRL. Conjunctiva, sclera clear.  SKIN: Killian Skin Type - V.  Groin examined. The dermatoscope was used to help evaluate pigmented lesions.  Skin Pertinent Findings:  Labia - scattered, smooth, epidermal lesions, white - consistent with cyst. Vary in size 5 mm- 10 mm      Diagnostic Test Results:  none     ASSESSMENT:     Encounter Diagnosis   Name Primary?     Sebaceous cyst Yes     MDM: discussed benign nature , options are to observe or incision    PLAN:   Patient Instructions   Recheck if needed                Again, thank you for allowing me to participate in the care of your patient.        Sincerely,        Ashley Gage MD

## 2021-04-06 NOTE — PROGRESS NOTES
[facility] - PRIMARY CARE SKIN    CC: lesions  SUBJECTIVE:   Celine Judd is a(n) 45 year old female who presents to clinic today      Issue one : for removal of the larger cysts on the labia because they get irritated.    Personal Medical History  Skin Cancer: NO  Eczema Psoriasis Lupus   NO NO NO   Other:   .    Family Medical History  Skin Cancer: NO  Eczema Psoriasis Lupus   NO NO mother   Other:       Occupation: [employer] for [employer]    Refer to electronic medical record (EMR) for past medical history and medications.      ROS: 14 point review of systems was negative except the symptoms listed above in the HPI.      OBJECTIVE:   GENERAL: healthy, alert and no distress.  HEENT: PERRL. Conjunctiva, sclera clear.  SKIN: Killian Skin Type - V.  Groin examined. The dermatoscope was used to help evaluate pigmented lesions.  Skin Pertinent Findings:  Labia - scattered, smooth, epidermal lesions, white - consistent with cyst. Vary in size 5 mm- 10 mm        Right labia - 3 cm X 1 cm smooth epidermal mass consistent with sebaceous cyst , 2 cm X 1 cm epidermal mass consistent with sebaceous cyst          Left labia - 3 cm X 1 cm smooth epidermal mass consistent with sebaceous cyst      Diagnostic Test Results:  none     ASSESSMENT:     Encounter Diagnosis   Name Primary?     Epidermoid cyst of vulva Yes     MDM: discussed removal of the larger cysts     PLAN:   Patient Instructions   Tub bath once per day for 3 days  Apply aquaphor two times per day for the next 5-7 days  Dr Gage's cell number

## 2021-04-07 ENCOUNTER — OFFICE VISIT (OUTPATIENT)
Dept: FAMILY MEDICINE | Facility: CLINIC | Age: 46
End: 2021-04-07
Payer: COMMERCIAL

## 2021-04-07 VITALS — DIASTOLIC BLOOD PRESSURE: 74 MMHG | SYSTOLIC BLOOD PRESSURE: 104 MMHG

## 2021-04-07 DIAGNOSIS — N90.7 EPIDERMOID CYST OF VULVA: Primary | ICD-10-CM

## 2021-04-07 PROCEDURE — 11421 EXC H-F-NK-SP B9+MARG 0.6-1: CPT | Mod: 59 | Performed by: FAMILY MEDICINE

## 2021-04-07 PROCEDURE — 11422 EXC H-F-NK-SP B9+MARG 1.1-2: CPT | Performed by: FAMILY MEDICINE

## 2021-04-07 PROCEDURE — 99207 PR NO CHARGE LOS: CPT | Performed by: FAMILY MEDICINE

## 2021-04-07 NOTE — PATIENT INSTRUCTIONS
Tub bath once per day for 3 days  Apply aquaphor two times per day for the next 5-7 days  Dr Gage's cell number

## 2021-04-07 NOTE — LETTER
4/7/2021         RE: Celine Judd  [address]  [facility] [address]        Dear Colleague,    Thank you for referring your patient, Celine Judd, to the [facility]. Please see a copy of my visit note below.    [facility] - PRIMARY CARE SKIN    CC: lesions  SUBJECTIVE:   Celine Judd is a(n) 45 year old female who presents to clinic today      Issue one : for removal of the larger cysts on the labia because they get irritated.    Personal Medical History  Skin Cancer: NO  Eczema Psoriasis Lupus   NO NO NO   Other:   .    Family Medical History  Skin Cancer: NO  Eczema Psoriasis Lupus   NO NO mother   Other:       Occupation: OR for [employer]    Refer to electronic medical record (EMR) for past medical history and medications.      ROS: 14 point review of systems was negative except the symptoms listed above in the HPI.      OBJECTIVE:   GENERAL: healthy, alert and no distress.  HEENT: PERRL. Conjunctiva, sclera clear.  SKIN: Killian Skin Type - V.  Groin examined. The dermatoscope was used to help evaluate pigmented lesions.  Skin Pertinent Findings:  Labia - scattered, smooth, epidermal lesions, white - consistent with cyst. Vary in size 5 mm- 10 mm        Right labia - 3 cm X 1 cm smooth epidermal mass consistent with sebaceous cyst , 2 cm X 1 cm epidermal mass consistent with sebaceous cyst          Left labia - 3 cm X 1 cm smooth epidermal mass consistent with sebaceous cyst      Diagnostic Test Results:  none     ASSESSMENT:     Encounter Diagnosis   Name Primary?     Epidermoid cyst of vulva Yes     MDM: discussed removal of the larger cysts     PLAN:   Patient Instructions   Tub bath once per day for 3 days  Apply aquaphor two times per day for the next 5-7 days  Dr Gage's cell number                 Again, thank you for allowing me to participate in the care of your patient.        Sincerely,        Ashley Gage MD

## 2021-04-07 NOTE — PROCEDURES
Name: excision  Indication: excision of lower epidermoid cyst on the labia  Location(s): right right lower labia  Completed by: Lindsey Gage MD.  Photo Taken: NO.  Anesthesia: Patient was anesthetized by infiltrating the area surrounding the lesion with 1% lidocaine and epinephrine 1:[id number].  Note: Prior to procedure we discussed expectations for healing, risk of infection, and scar formation. Discussed other treatment options available. Discussed the risk of pain, infection, scarring, hypo- or hyperpigmentation and recurrence or need for re-treatment. The benefits of treatment and alternative treatments were also discussed.    During this procedure, the universal protocol was utilized. The patient's identity was confirmed by no less than two patient identifiers, correct procedure was verified, correct site was verified and marked as applicable and a final pause was completed.    Sterile technique was used throughout the procedure. The skin was cleaned and prepped with Chloroprep.  Sterile drapes were laid out. Once adequate anesthesia was obtained, an  incision was made with a #15 blade over the top of the cyst and then dissected out with Metzenbaum in total  . No stiches were required . No bleeding on discharge.    Direct pressure and monopolar cautery was applied for hemostasis.     Minimal bleeding occurred. Dressing was applied and patient left in satisfactory condition.    Final length- 1 cm      Name: excision  Indication: excision of lower epidermoid cyst on the labia  Location(s): right mid labia  Completed by: Lnidsey Gage MD.  Photo Taken: NO.  Anesthesia: Patient was anesthetized by infiltrating the area surrounding the lesion with 1% lidocaine and epinephrine 1:[id number].  Note: Prior to procedure we discussed expectations for healing, risk of infection, and scar formation. Discussed other treatment options available. Discussed the risk of pain, infection, scarring, hypo- or hyperpigmentation and  recurrence or need for re-treatment. The benefits of treatment and alternative treatments were also discussed.    During this procedure, the universal protocol was utilized. The patient's identity was confirmed by no less than two patient identifiers, correct procedure was verified, correct site was verified and marked as applicable and a final pause was completed.    Sterile technique was used throughout the procedure. The skin was cleaned and prepped with Chloroprep.  Sterile drapes were laid out. Once adequate anesthesia was obtained, an  incision was made with a #15 blade over the top of the cyst and then dissected out with Metzenbaum in total.  . No bleeding on discharge.    Direct pressure and monopolar cautery was applied for hemostasis.     Minimal bleeding occurred. Dressing was applied and patient left in satisfactory condition.    Final length 2 cm      Name: excision  Indication: excision of lower epidermoid cyst on the labia  Location(s): left labia  Completed by: Lindsey Gage MD.  Photo Taken: NO.  Anesthesia: Patient was anesthetized by infiltrating the area surrounding the lesion with 1% lidocaine and epinephrine 1:[id number].  Note: Prior to procedure we discussed expectations for healing, risk of infection, and scar formation. Discussed other treatment options available. Discussed the risk of pain, infection, scarring, hypo- or hyperpigmentation and recurrence or need for re-treatment. The benefits of treatment and alternative treatments were also discussed.    During this procedure, the universal protocol was utilized. The patient's identity was confirmed by no less than two patient identifiers, correct procedure was verified, correct site was verified and marked as applicable and a final pause was completed.    Sterile technique was used throughout the procedure. The skin was cleaned and prepped with Chloroprep.  Sterile drapes were laid out. Once adequate anesthesia was obtained, an  incision was  made with a #15 blade over the top of the cyst and then dissected out with Metzenbaum in total  . No stiches were required . No bleeding on discharge.    Direct pressure and monopolar cautery was applied for hemostasis.     Minimal bleeding occurred. Dressing was applied and patient left in satisfactory condition.  Final length- 6 mm

## 2021-04-23 NOTE — TELEPHONE ENCOUNTER
Please send out pap result letter dated 3/16/21 as Certified.  We are unable to verify that the patient has been notified of result.

## 2021-05-10 ENCOUNTER — OFFICE VISIT (OUTPATIENT)
Dept: FAMILY MEDICINE | Facility: CLINIC | Age: 46
End: 2021-05-10
Payer: COMMERCIAL

## 2021-05-10 VITALS
RESPIRATION RATE: 14 BRPM | WEIGHT: 115 LBS | DIASTOLIC BLOOD PRESSURE: 72 MMHG | SYSTOLIC BLOOD PRESSURE: 106 MMHG | HEIGHT: 66 IN | TEMPERATURE: 98.8 F | HEART RATE: 99 BPM | OXYGEN SATURATION: 100 % | BODY MASS INDEX: 18.48 KG/M2

## 2021-05-10 DIAGNOSIS — F43.21 GRIEF REACTION: ICD-10-CM

## 2021-05-10 DIAGNOSIS — N90.7 EPIDERMOID CYST OF VULVA: ICD-10-CM

## 2021-05-10 DIAGNOSIS — R87.613 HSIL (HIGH GRADE SQUAMOUS INTRAEPITHELIAL LESION) ON PAP SMEAR OF CERVIX: ICD-10-CM

## 2021-05-10 DIAGNOSIS — H40.003 GLAUCOMA SUSPECT, BILATERAL: Primary | ICD-10-CM

## 2021-05-10 PROCEDURE — 99213 OFFICE O/P EST LOW 20 MIN: CPT | Performed by: PHYSICIAN ASSISTANT

## 2021-05-10 ASSESSMENT — PAIN SCALES - GENERAL: PAINLEVEL: NO PAIN (0)

## 2021-05-10 ASSESSMENT — MIFFLIN-ST. JEOR: SCORE: 1170.45

## 2021-05-10 NOTE — PROGRESS NOTES
"    Assessment & Plan   Problem List Items Addressed This Visit        Other    HSIL (high grade squamous intraepithelial lesion) on Pap smear of cervix      Other Visit Diagnoses     Glaucoma suspect, bilateral    -  Primary    Epidermoid cyst of vulva        Grief reaction             FMLA paperwork completed.   Copy scanned for the chart.       20 minutes spent on the date of the encounter doing chart review, patient visit and documentation            Return in about 1 week (around 5/17/2021) for Specialist Appointment.    CINDY Baird [facility] THERESA PRAIRIMJ Berger is a 45 year old who presents for the following health issues     HPI     Concern - Paperwork    Patient has many medical appointments and cannot complete them while working a full schedule.   Diagnosis includes glaucoma, vulvar skin lesions, abnormal Pap requiring a LEEP procedure.  I have also recommended grief counseling.            Review of Systems         Objective    /72   Pulse 99   Temp 98.8  F (37.1  C) (Tympanic)   Resp 14   Ht 1.664 m (5' 5.5\")   Wt 52.2 kg (115 lb)   SpO2 100%   BMI 18.85 kg/m    Body mass index is 18.85 kg/m .  Physical Exam  Constitutional:       General: She is not in acute distress.     Appearance: She is well-developed. She is not diaphoretic.   HENT:      Head: Normocephalic.      Right Ear: External ear normal.      Left Ear: External ear normal.      Nose: Nose normal.   Eyes:      Conjunctiva/sclera: Conjunctivae normal.   Neck:      Musculoskeletal: Normal range of motion.   Pulmonary:      Effort: Pulmonary effort is normal.   Neurological:      Mental Status: She is alert and oriented to person, place, and time.   Psychiatric:         Judgment: Judgment normal.                        "

## 2021-05-12 ENCOUNTER — TELEPHONE (OUTPATIENT)
Dept: FAMILY MEDICINE | Facility: CLINIC | Age: 46
End: 2021-05-12

## 2021-05-12 NOTE — TELEPHONE ENCOUNTER
FMLA form received and signed by Moises Mobley  And faxed too [phone].  Thank you,   Betsy Lovell

## 2021-05-13 ENCOUNTER — ALLIED HEALTH/NURSE VISIT (OUTPATIENT)
Dept: NURSING | Facility: CLINIC | Age: 46
End: 2021-05-13
Payer: COMMERCIAL

## 2021-05-13 VITALS — DIASTOLIC BLOOD PRESSURE: 68 MMHG | HEART RATE: 72 BPM | SYSTOLIC BLOOD PRESSURE: 103 MMHG

## 2021-05-13 DIAGNOSIS — Z30.42 DEPO-PROVERA CONTRACEPTIVE STATUS: Primary | ICD-10-CM

## 2021-05-13 PROCEDURE — 96372 THER/PROPH/DIAG INJ SC/IM: CPT

## 2021-05-13 RX ADMIN — MEDROXYPROGESTERONE ACETATE 150 MG: 150 INJECTION, SUSPENSION INTRAMUSCULAR at 14:21

## 2021-05-13 NOTE — NURSING NOTE
Clinic Administered Medication Documentation      Depo Provera Documentation    URINE HCG: not indicated    Depo-Provera Standing Order inclusion/exclusion criteria reviewed.   Patient meets: inclusion criteria     BP: 103/68  LAST PAP/EXAM:   Lab Results   Component Value Date    PAP HSIL 02/23/2021       Prior to injection, verified patient identity using patient's name and date of birth. Medication was administered. Please see MAR and medication order for additional information.     Was entire vial of medication used? Yes  Vial/Syringe: Single dose vial  Expiration Date:  9/2022    Patient instructed to remain in clinic for 15 minutes.  NEXT INJECTION DUE: 7/29/21 - 8/12/21

## 2021-05-13 NOTE — NURSING NOTE
Celine Judd is a 45 year old patient who comes in today for a Blood Pressure check.  Initial BP:  /68 (BP Location: Left arm, Patient Position: Chair, Cuff Size: Adult Regular)   Pulse 72      72  Disposition: follow-up as previously indicated by provider.    Clinic Administered Medication Documentation      Depo Provera Documentation    URINE HCG: not indicated    Depo-Provera Standing Order inclusion/exclusion criteria reviewed.   Patient meets: inclusion criteria     BP: 103/68  LAST PAP/EXAM:   Lab Results   Component Value Date    PAP HSIL 02/23/2021       Prior to injection, verified patient identity using patient's name and date of birth. Medication was administered. Please see MAR and medication order for additional information.     Was entire vial of medication used? Yes  Vial/Syringe: Single dose vial  Expiration Date:  09/30/2022    Patient instructed to remain in clinic for 15 minutes.  NEXT INJECTION DUE: 7/29/21 - 8/12/21

## 2021-06-07 NOTE — PROGRESS NOTES
[facility] - PRIMARY CARE SKIN    CC: lesions  SUBJECTIVE:   Celine Judd is a(n) 45 year old female who presents to clinic today      Issue one : for removal of the larger cysts on the labia because they get irritated.    Personal Medical History  Skin Cancer: NO  Eczema Psoriasis Lupus   NO NO NO   Other:   .    Family Medical History  Skin Cancer: NO  Eczema Psoriasis Lupus   NO NO mother   Other:       Occupation: MD for [employer]    Refer to electronic medical record (EMR) for past medical history and medications.      ROS: 14 point review of systems was negative except the symptoms listed above in the HPI.      OBJECTIVE:   GENERAL: healthy, alert and no distress.  HEENT: PERRL. Conjunctiva, sclera clear.  SKIN: Killian Skin Type - V.  Groin examined. The dermatoscope was used to help evaluate pigmented lesions.  Skin Pertinent Findings:          Right flexural crease - 1 cm smooth epidermal mass consistent with sebaceous cyst          Left labia  - 1 cm  smooth epidermal mass consistent with sebaceous cyst x 3      Diagnostic Test Results:  none     ASSESSMENT:     Encounter Diagnosis   Name Primary?     Epidermal cyst Yes         PLAN:   Patient Instructions   FUTURE APPOINTMENTS  Follow up suture removal if stitches does not dissolved.    Tub bath every night for 3 nights.   Vaseline or Aquaphor and gauze over the area for daily       WOUND CARE INSTRUCTIONS  1. Wash hands before every dressing change.  2. Change the dressing after 24 hours and once daily, or earlier if it becomes saturated.  3. Wash the wound area with a mild soap, then rinse.  4. Gently pat dry with a sterile gauze or Q-tip.  5. Using a Q-tip, apply Vaseline or Aquaphor only over entire wound. DO NOT use Neosporin - as many people react to neomycin.  6. Finally, cover with a bandage or sterile non-stick gauze with micropore paper tape.  7. Repeat once daily until wound has healed.      Soap, water and shampoo will not hurt  "this area.    Do not go swimming or take baths, but showering is encouraged.    Limit use of the area where the procedure was done for a few days to allow for optimal healing.    Signs of Infection:  Infection can occur in any area where skin has been disrupted. If you notice persistent redness, swelling, colored drainage, increasing pain, fever or other signs of infection, please call us at: [phone] and ask to have me or my colleague paged. We will call you back to discuss.    If you experience bleeding:  Wash hands and hold firm pressure on the area for 10 minutes without checking to see if the bleeding has stopped. \"Checking\" pulls off the protective wound clot and restarts the bleeding all over again. Re-apply pressure for 10 minutes if necessary to stop bleeding.  Use additional sterile gauze and tape to maintain pressure once bleeding has stopped.  If bleeding continues, then call back to clinic at [phone].    PATIENT INFORMATION : WOUNDS  During the healing process you will notice a number of changes.     All wounds normally drain.  The larger the wound the more drainage there will be.  After 7-10 days, you will notice the wound beginning to shrink and new skin will begin to grow.  The wound is healed when you can see that skin has formed over the entire area.  A healed wound has a healthy, shiny look to the surface and is red to dark pink in color to normalize.  Wounds may take approximately 4-6 weeks to heal.  Larger wounds may take 6-8 weeks. After the wound is healed you may discontinue dressing changes.    All wounds develop a small halo of redness surrounding the wound which means that healing is occurring. Severe itching with extensive redness usually indicates sensitivity to the ointment or bandage tape used to dress the wound.  You should call our office if severe itching with extensive redness develops.    Swelling  and/or discoloration around your surgical site is common, particularly " when performed around the eye.  You may experience a sensation of tightness as your wound heals. This is normal and will gradually subside.  Your healed wound may be sensitive to temperature changes. This sensitivity improves with time, but if you re having a lot of discomfort, try to avoid temperature extremes.  Patients frequently experience itching after their wound appears to have healed because of the continue healing under the skin.  Plain Vaseline will help relieve the itching.

## 2021-06-08 ENCOUNTER — OFFICE VISIT (OUTPATIENT)
Dept: FAMILY MEDICINE | Facility: CLINIC | Age: 46
End: 2021-06-08
Payer: COMMERCIAL

## 2021-06-08 VITALS — DIASTOLIC BLOOD PRESSURE: 78 MMHG | SYSTOLIC BLOOD PRESSURE: 122 MMHG

## 2021-06-08 DIAGNOSIS — L72.0 EPIDERMAL CYST: Primary | ICD-10-CM

## 2021-06-08 DIAGNOSIS — I10 BENIGN ESSENTIAL HYPERTENSION: ICD-10-CM

## 2021-06-08 PROCEDURE — 11421 EXC H-F-NK-SP B9+MARG 0.6-1: CPT | Mod: 59 | Performed by: FAMILY MEDICINE

## 2021-06-08 PROCEDURE — 11421 EXC H-F-NK-SP B9+MARG 0.6-1: CPT | Performed by: FAMILY MEDICINE

## 2021-06-08 PROCEDURE — 99207 PR NO CHARGE LOS: CPT | Performed by: FAMILY MEDICINE

## 2021-06-08 RX ORDER — AMLODIPINE BESYLATE 10 MG/1
10 TABLET ORAL DAILY
Qty: 90 TABLET | Refills: 1 | Status: SHIPPED | OUTPATIENT
Start: 2021-06-08 | End: 2022-08-01

## 2021-06-08 NOTE — LETTER
6/8/2021         RE: Celine Judd  [address]  [facility] [address]        Dear Colleague,    Thank you for referring your patient, Celine Judd, to the [facility]. Please see a copy of my visit note below.    [facility] - PRIMARY CARE SKIN    CC: lesions  SUBJECTIVE:   Celine Judd is a(n) 45 year old female who presents to clinic today      Issue one : for removal of the larger cysts on the labia because they get irritated.    Personal Medical History  Skin Cancer: NO  Eczema Psoriasis Lupus   NO NO NO   Other:   .    Family Medical History  Skin Cancer: NO  Eczema Psoriasis Lupus   NO NO mother   Other:       Occupation: [place] for [employer]    Refer to electronic medical record (EMR) for past medical history and medications.      ROS: 14 point review of systems was negative except the symptoms listed above in the HPI.      OBJECTIVE:   GENERAL: healthy, alert and no distress.  HEENT: PERRL. Conjunctiva, sclera clear.  SKIN: Killian Skin Type - V.  Groin examined. The dermatoscope was used to help evaluate pigmented lesions.  Skin Pertinent Findings:          Right flexural crease - 1 cm smooth epidermal mass consistent with sebaceous cyst          Left labia  - 1 cm  smooth epidermal mass consistent with sebaceous cyst x 3      Diagnostic Test Results:  none     ASSESSMENT:     Encounter Diagnosis   Name Primary?     Epidermal cyst Yes         PLAN:   Patient Instructions   FUTURE APPOINTMENTS  Follow up suture removal if stitches does not dissolved.    Tub bath every night for 3 nights.   Vaseline or Aquaphor and gauze over the area for daily       WOUND CARE INSTRUCTIONS  1. Wash hands before every dressing change.  2. Change the dressing after 24 hours and once daily, or earlier if it becomes saturated.  3. Wash the wound area with a mild soap, then rinse.  4. Gently pat dry with a sterile gauze or Q-tip.  5. Using a Q-tip, apply Vaseline or Aquaphor only  "over entire wound. DO NOT use Neosporin - as many people react to neomycin.  6. Finally, cover with a bandage or sterile non-stick gauze with micropore paper tape.  7. Repeat once daily until wound has healed.      Soap, water and shampoo will not hurt this area.    Do not go swimming or take baths, but showering is encouraged.    Limit use of the area where the procedure was done for a few days to allow for optimal healing.    Signs of Infection:  Infection can occur in any area where skin has been disrupted. If you notice persistent redness, swelling, colored drainage, increasing pain, fever or other signs of infection, please call us at: [phone] and ask to have me or my colleague paged. We will call you back to discuss.    If you experience bleeding:  Wash hands and hold firm pressure on the area for 10 minutes without checking to see if the bleeding has stopped. \"Checking\" pulls off the protective wound clot and restarts the bleeding all over again. Re-apply pressure for 10 minutes if necessary to stop bleeding.  Use additional sterile gauze and tape to maintain pressure once bleeding has stopped.  If bleeding continues, then call back to clinic at [phone].    PATIENT INFORMATION : WOUNDS  During the healing process you will notice a number of changes.     All wounds normally drain.  The larger the wound the more drainage there will be.  After 7-10 days, you will notice the wound beginning to shrink and new skin will begin to grow.  The wound is healed when you can see that skin has formed over the entire area.  A healed wound has a healthy, shiny look to the surface and is red to dark pink in color to normalize.  Wounds may take approximately 4-6 weeks to heal.  Larger wounds may take 6-8 weeks. After the wound is healed you may discontinue dressing changes.    All wounds develop a small halo of redness surrounding the wound which means that healing is occurring. Severe itching with extensive " redness usually indicates sensitivity to the ointment or bandage tape used to dress the wound.  You should call our office if severe itching with extensive redness develops.    Swelling  and/or discoloration around your surgical site is common, particularly when performed around the eye.  You may experience a sensation of tightness as your wound heals. This is normal and will gradually subside.  Your healed wound may be sensitive to temperature changes. This sensitivity improves with time, but if you re having a lot of discomfort, try to avoid temperature extremes.  Patients frequently experience itching after their wound appears to have healed because of the continue healing under the skin.  Plain Vaseline will help relieve the itching.                    Again, thank you for allowing me to participate in the care of your patient.        Sincerely,        Ashley Gage MD

## 2021-06-08 NOTE — PATIENT INSTRUCTIONS
"FUTURE APPOINTMENTS  Follow up suture removal if stitches does not dissolved.    Tub bath every night for 3 nights.   Vaseline or Aquaphor and gauze over the area for daily       WOUND CARE INSTRUCTIONS  1. Wash hands before every dressing change.  2. Change the dressing after 24 hours and once daily, or earlier if it becomes saturated.  3. Wash the wound area with a mild soap, then rinse.  4. Gently pat dry with a sterile gauze or Q-tip.  5. Using a Q-tip, apply Vaseline or Aquaphor only over entire wound. DO NOT use Neosporin - as many people react to neomycin.  6. Finally, cover with a bandage or sterile non-stick gauze with micropore paper tape.  7. Repeat once daily until wound has healed.      Soap, water and shampoo will not hurt this area.    Do not go swimming or take baths, but showering is encouraged.    Limit use of the area where the procedure was done for a few days to allow for optimal healing.    Signs of Infection:  Infection can occur in any area where skin has been disrupted. If you notice persistent redness, swelling, colored drainage, increasing pain, fever or other signs of infection, please call us at: [phone] and ask to have me or my colleague paged. We will call you back to discuss.    If you experience bleeding:  Wash hands and hold firm pressure on the area for 10 minutes without checking to see if the bleeding has stopped. \"Checking\" pulls off the protective wound clot and restarts the bleeding all over again. Re-apply pressure for 10 minutes if necessary to stop bleeding.  Use additional sterile gauze and tape to maintain pressure once bleeding has stopped.  If bleeding continues, then call back to clinic at [phone].    PATIENT INFORMATION : WOUNDS  During the healing process you will notice a number of changes.     All wounds normally drain.  The larger the wound the more drainage there will be.  After 7-10 days, you will notice the wound beginning to shrink and new skin " will begin to grow.  The wound is healed when you can see that skin has formed over the entire area.  A healed wound has a healthy, shiny look to the surface and is red to dark pink in color to normalize.  Wounds may take approximately 4-6 weeks to heal.  Larger wounds may take 6-8 weeks. After the wound is healed you may discontinue dressing changes.    All wounds develop a small halo of redness surrounding the wound which means that healing is occurring. Severe itching with extensive redness usually indicates sensitivity to the ointment or bandage tape used to dress the wound.  You should call our office if severe itching with extensive redness develops.    Swelling  and/or discoloration around your surgical site is common, particularly when performed around the eye.  You may experience a sensation of tightness as your wound heals. This is normal and will gradually subside.  Your healed wound may be sensitive to temperature changes. This sensitivity improves with time, but if you re having a lot of discomfort, try to avoid temperature extremes.  Patients frequently experience itching after their wound appears to have healed because of the continue healing under the skin.  Plain Vaseline will help relieve the itching.

## 2021-06-08 NOTE — PROCEDURES
Name: Wide Excision  Indication:  excision of cyst on Left labia.  Location(s): Left labia - 1 cm  smooth epidermal mass consistent with sebaceous cyst  Completed by: Lindsey Gage MD.  Photo Taken: NO.  Anesthesia: Patient was anesthetized by infiltrating the area surrounding the lesion with 1% lidocaine and epinephrine [id number].  Note: Prior to procedure we discussed expectations for healing, risk of infection, and scar formation. Discussed other treatment options available. Discussed the risk of pain, infection, scarring, hypo- or hyperpigmentation and recurrence or need for re-treatment. The benefits of treatment and alternative treatments were also discussed.    During this procedure, the universal protocol was utilized. The patient's identity was confirmed by no less than two patient identifiers, correct procedure was verified, correct site was verified and marked as applicable and a final pause was completed.    Sterile technique was used throughout the procedure. The skin was cleaned and prepped with Chloroprep.  Sterile drapes were laid out. Once adequate anesthesia was obtained, an incision was made with a #15 blade over the cyst through the epidermis and dermis.    Direct pressure and cautery was applied for hemostasis.   Minimal bleeding occurred. Dressing was applied and patient left in satisfactory condition.    Name:  Excision  Indication:  excision of cyst on Left labia.  Location(s): Left labia - 1 cm  smooth epidermal mass consistent with sebaceous cyst  Completed by: Lindsey Gage MD.  Photo Taken: NO.  Anesthesia: Patient was anesthetized by infiltrating the area surrounding the lesion with 1% lidocaine and epinephrine 1:[id number].  Note: Prior to procedure we discussed expectations for healing, risk of infection, and scar formation. Discussed other treatment options available. Discussed the risk of pain, infection, scarring, hypo- or hyperpigmentation and recurrence or need for re-treatment. The  benefits of treatment and alternative treatments were also discussed.    During this procedure, the universal protocol was utilized. The patient's identity was confirmed by no less than two patient identifiers, correct procedure was verified, correct site was verified and marked as applicable and a final pause was completed.    Sterile technique was used throughout the procedure. The skin was cleaned and prepped with Chloroprep.  Sterile drapes were laid out. Once adequate anesthesia was obtained, an incision was made with a #15 blade over the cyst through the epidermis and dermis.     Direct pressure and cautery was applied for hemostasis.   Minimal bleeding occurred. Dressing was applied and patient left in satisfactory condition.    Name:  Excision  Indication:  excision of cyst on Left labia.  Location(s): Left labia - 1 cm  smooth epidermal mass consistent with sebaceous cyst  Completed by: Lindsey Gage MD.  Photo Taken: NO.  Anesthesia: Patient was anesthetized by infiltrating the area surrounding the lesion with 1% lidocaine and epinephrine 1:[id number].  Note: Prior to procedure we discussed expectations for healing, risk of infection, and scar formation. Discussed other treatment options available. Discussed the risk of pain, infection, scarring, hypo- or hyperpigmentation and recurrence or need for re-treatment. The benefits of treatment and alternative treatments were also discussed.    During this procedure, the universal protocol was utilized. The patient's identity was confirmed by no less than two patient identifiers, correct procedure was verified, correct site was verified and marked as applicable and a final pause was completed.    Sterile technique was used throughout the procedure. The skin was cleaned and prepped with Chloroprep.  Sterile drapes were laid out. Once adequate anesthesia was obtained, an incision was made with a #15 blade over the cyst     Direct pressure and cautery was applied for  hemostasis.   Minimal bleeding occurred. Dressing was applied and patient left in satisfactory condition.      Name: Excision  Indication:  Excision of cyst  right flexural crease.  Location(s):        Right flexural crease- 10 mm subepidermal mass      Completed by: Lindsey Gage MD.  Photo Taken: NO.  Anesthesia: Patient was anesthetized by infiltrating the area surrounding the lesion with 1% lidocaine and epinephrine 1:[id number].  Note: Prior to procedure we discussed expectations for healing, risk of infection, and scar formation. Discussed other treatment options available. Discussed the risk of pain, infection, scarring, hypo- or hyperpigmentation and recurrence or need for re-treatment. The benefits of treatment and alternative treatments were also discussed.    During this procedure, the universal protocol was utilized. The patient's identity was confirmed by no less than two patient identifiers, correct procedure was verified, correct site was verified and marked as applicable and a final pause was completed.    Sterile technique was used throughout the procedure. The skin was cleaned and prepped with Chloroprep. Sterile drapes were laid out. Once adequate anesthesia was obtained, an  incision was made with a #15 blade over the cyst through the epidermis and dermis. Cyst was dissected out with a Metzenbaum.    Direct pressure and monopolar cautery applied for hemostasis.                             Edges were approximated with 3-0 Vicryl  interrupted simple stitch.  Minimal bleeding occurred. Dressing was applied and patient left in satisfactory condition.

## 2021-06-14 ENCOUNTER — TELEPHONE (OUTPATIENT)
Dept: FAMILY MEDICINE | Facility: CLINIC | Age: 46
End: 2021-06-14

## 2021-06-14 NOTE — TELEPHONE ENCOUNTER
Patient was called to schedule LEEP, but was informed that it needed to be ordered.   Please fax order for LEEP to [phone].  Nery Adams RN

## 2021-06-15 ENCOUNTER — TELEPHONE (OUTPATIENT)
Dept: OBGYN | Facility: CLINIC | Age: 46
End: 2021-06-15

## 2021-06-15 NOTE — TELEPHONE ENCOUNTER
Left message for patient to call me back at [phone].  Patient had HSIL, +HR HPV on her pap in 2/2021 and we were unable to contact her at that time. We sent out a Certified pap result letter and we have not spoke with her regarding her result, I am unsure where she might be scheduling.   Typically we would refer the patient to Gyn for management/consult regarding the LEEP recommendation.  Would need to discuss this with the patient.    Lynette Nissen RN

## 2021-06-15 NOTE — TELEPHONE ENCOUNTER
Patient called back, she is planning to schedule with [facility] and was advised by  that an order needs to be placed/faxed for a LEEP.  I spoke with one of the RNs at [facility] and we discussed that the patient can call there and speak with her to get LEEP set up.  I let the patient know this and patient will call to speak with the RN.    Lynette Nissen RN

## 2021-06-15 NOTE — TELEPHONE ENCOUNTER
New pt to us, will have her make an appointment-may need colp or LEEP procedure. Did discuss with Dr. Steve, a consult appt is appropriate with possible colp.  Pt notified and transferred to [place] to schedule  Danika Shane RN on 6/15/2021 at 10:45 AM

## 2021-06-21 ENCOUNTER — OFFICE VISIT (OUTPATIENT)
Dept: OBGYN | Facility: CLINIC | Age: 46
End: 2021-06-21
Payer: COMMERCIAL

## 2021-06-21 VITALS
BODY MASS INDEX: 17.84 KG/M2 | WEIGHT: 111 LBS | DIASTOLIC BLOOD PRESSURE: 70 MMHG | SYSTOLIC BLOOD PRESSURE: 102 MMHG | HEIGHT: 66 IN

## 2021-06-21 DIAGNOSIS — R87.613 HSIL (HIGH GRADE SQUAMOUS INTRAEPITHELIAL LESION) ON PAP SMEAR OF CERVIX: ICD-10-CM

## 2021-06-21 DIAGNOSIS — Z01.812 PRE-PROCEDURE LAB EXAM: Primary | ICD-10-CM

## 2021-06-21 LAB — HCG UR QL: NEGATIVE

## 2021-06-21 PROCEDURE — 88305 TISSUE EXAM BY PATHOLOGIST: CPT | Performed by: PATHOLOGY

## 2021-06-21 PROCEDURE — 88342 IMHCHEM/IMCYTCHM 1ST ANTB: CPT | Performed by: PATHOLOGY

## 2021-06-21 PROCEDURE — 81025 URINE PREGNANCY TEST: CPT | Performed by: OBSTETRICS & GYNECOLOGY

## 2021-06-21 PROCEDURE — 57454 BX/CURETT OF CERVIX W/SCOPE: CPT | Performed by: OBSTETRICS & GYNECOLOGY

## 2021-06-21 ASSESSMENT — MIFFLIN-ST. JEOR: SCORE: 1152.3

## 2021-06-21 NOTE — PROGRESS NOTES
INDICATIONS:                                                        Celine Judd, is a 45 year old female, who had a recent HGSIL pap.  HPV Other positive Yes prior history of abnormal pap. Here today for colposcopy. Discussed indication, risks of infection and bleeding.    Her last pap was   Lab Results   Component Value Date    PAP HSIL 02/23/2021       Is a pregnancy test required: Yes.  Was it positive or negative?  Negative  Was a consent obtained?  Yes    HISTORY / OVERVIEW  4/24/15 Pap LSIL with pos HPV. Plan: colp  6/18/15: colpo RYLAN II. Plan: LEEP  10/27/16 Pap ASC-H with pos HPV. Plan: colp  12/22/16 [place] RYLAN I. Plan: pap/hpv in 6 months.   03/16/18: NIL pap, + HR HPV (not 16 or 18) result. Plan [place].   6/18/18 3mo [place] not done, updated to 6mo [place]/Pap due by 9/16/18 1/1/19 Lost to follow-up for pap tracking  2/23/21 HSIL pap, + HR HPV (not 16 or 18). Plan LEEP due bef 5/23/21.      PROCEDURE:                                                      Cervix is stained with acetic acid and viewed colposcopically. Squamocolumnar junction is not visualized in it's entirety. acetowhite lesion(s) noted at 7 o'clock and abnormal vessels noted at 7 o'clock . Biopsy done Yes. Endocervical curretage Done      POST PROCEDURE:                                                      IMPRESSION: colposcopy inadequate, limited by inability to see entire squamocolumnar junction and RYLAN 2    PLAN : Await the results of the biopsies.  She tolerated the procedure well. There were no complications. Patient was discharged in stable condition.    Patient advised to call the clinic if excessive bleeding, pelvic pain, or fever.     Follow-up plan LEEP unless cervical cancer noted on biopsy.  Becky Luciano MD

## 2021-06-23 LAB — COPATH REPORT: NORMAL

## 2021-06-24 ENCOUNTER — DOCUMENTATION ONLY (OUTPATIENT)
Dept: OBGYN | Facility: CLINIC | Age: 46
End: 2021-06-24

## 2021-06-24 ENCOUNTER — TELEPHONE (OUTPATIENT)
Dept: OBGYN | Facility: CLINIC | Age: 46
End: 2021-06-24

## 2021-06-24 NOTE — PROGRESS NOTES
Please schedule for surgery:    Patient Name:  Celine Judd ([id number]).  :  1975      Physician requests assist from:  None  Requested Dates:  Patient preference  Schedule based on:  Patient preference  Amount of time needed for the procedure:  45   Expected time off from work:  1 day  Surgeon:  Becky Luciano MD  Surgery permit to read:  LEEP procedure  Preop Needed:  No  Location for surgery to performed:   In Clinic  Anesthesia:  Local (ask Ankita day of procedure)     Allergies   Allergen Reactions     Lisinopril Swelling     Lip Angioedema     Nickel allergy:  Not Applicable  EMB: Not applicable    DIAGNOSIS:  HSIL    Special instructions:  none  Vendor Rep:  no  Meds Needed: Ibuprofen 600mg 1 hour prior

## 2021-07-05 ENCOUNTER — PATIENT OUTREACH (OUTPATIENT)
Dept: OBGYN | Facility: CLINIC | Age: 46
End: 2021-07-05

## 2021-07-05 DIAGNOSIS — R87.613 HSIL (HIGH GRADE SQUAMOUS INTRAEPITHELIAL LESION) ON PAP SMEAR OF CERVIX: ICD-10-CM

## 2021-07-08 ENCOUNTER — TELEPHONE (OUTPATIENT)
Dept: OBGYN | Facility: CLINIC | Age: 46
End: 2021-07-08

## 2021-07-14 NOTE — PROGRESS NOTES
46 year old  presents for LEEP.  She had a Pap on 2021 that revealed HSIL & + HR HPV other and coloposcopic directed biopsies on 2021 that revealed high grade changes.   LEEP was recommended 2021.  Discussed possible risks including cervical incompetence, infection, bleeding, discomfort, and possible incomplete excision of the abnormal tissue so close follow up was necessary. Consent was obtained and all questions were answered.    Pap history  4/24/15 Pap LSIL with pos HPV. Plan: colp  6/18/15: colpo RYLAN II. Plan: LEEP  10/27/16 Pap ASC-H with pos HPV. Plan: colp  16 [place] RYLAN I. Plan: pap/hpv in 6 months.   18: NIL pap, + HR HPV (not 16 or 18) result. Plan [place].   18 3mo [place] not done, updated to 6mo [place]/Pap due by 18 Lost to follow-up for pap tracking  21 HSIL pap, + HR HPV (not 16 or 18). Plan LEEP due bef 21.  21 [place]- High grade changes. Plan LEEP. Pt notified    Today she has no complaints.     There were no vitals taken for this visit.     Urine Pregnancy Test: negative     Procedure: LEEP    Indication:  RYLAN 2    The procedure, its risks and goals as well as complications were discussed with the patient.  She agreed to proceed.  She was placed in the dorsal lithotomy position and a coated speculum inserted into the vagina.  The cervix was exposed. Cervix was prepped with betadine.  A paracervical block with 0.25% Marcaine, injecting 5mL at 4 and 8 o'clock, was performed. The cervix was injected with a solution of 1% Lidocaine with 1:200,000 epinephrine.  Lugol's solution was then applied. Following this, a LEEP of the cervix was carried out in 2 passes plus a top hat for endocervical specimen.  A post leep ECC was not performed.  The bed of the LEEP was treated with electrocautery and Monsel's solution.  The instruments were removed.     The patient tolerated the procedurewell     46 year old  with RYLAN 2 on recent colopscopy .Will  contact patient with pathology report and follow up plan.   Post-LEEP instructions were given to the patient.  She was told to expect heavy discharge for the first 4-7 days and could continue for 2 weeks. Nothing in the vagina and no intercourse for 4-6 weeks.  No heavy lifting for next few days.  Return for any signs of infection or heavy bleeding.    Dr. Becky Luciano

## 2021-07-15 ENCOUNTER — OFFICE VISIT (OUTPATIENT)
Dept: OBGYN | Facility: CLINIC | Age: 46
End: 2021-07-15
Payer: COMMERCIAL

## 2021-07-15 DIAGNOSIS — Z01.812 PRE-PROCEDURE LAB EXAM: Primary | ICD-10-CM

## 2021-07-15 DIAGNOSIS — N87.1 CIN II (CERVICAL INTRAEPITHELIAL NEOPLASIA II): ICD-10-CM

## 2021-07-15 LAB — HCG UR QL: NEGATIVE

## 2021-07-15 PROCEDURE — 57522 CONIZATION OF CERVIX: CPT | Performed by: OBSTETRICS & GYNECOLOGY

## 2021-07-15 PROCEDURE — 81025 URINE PREGNANCY TEST: CPT | Performed by: OBSTETRICS & GYNECOLOGY

## 2021-07-15 PROCEDURE — 88307 TISSUE EXAM BY PATHOLOGIST: CPT | Performed by: PATHOLOGY

## 2021-07-15 RX ORDER — BUPIVACAINE HYDROCHLORIDE 2.5 MG/ML
10 INJECTION, SOLUTION INFILTRATION; PERINEURAL ONCE
Status: COMPLETED | OUTPATIENT
Start: 2021-07-15 | End: 2021-07-15

## 2021-07-15 RX ORDER — LIDOCAINE HYDROCHLORIDE AND EPINEPHRINE 10; 10 MG/ML; UG/ML
20 INJECTION, SOLUTION INFILTRATION; PERINEURAL ONCE
Status: COMPLETED | OUTPATIENT
Start: 2021-07-15 | End: 2021-07-15

## 2021-07-15 RX ADMIN — BUPIVACAINE HYDROCHLORIDE 25 MG: 2.5 INJECTION, SOLUTION INFILTRATION; PERINEURAL at 13:57

## 2021-07-15 RX ADMIN — LIDOCAINE HYDROCHLORIDE AND EPINEPHRINE 50 ML: 10; 10 INJECTION, SOLUTION INFILTRATION; PERINEURAL at 13:58

## 2021-07-19 LAB
PATH REPORT.COMMENTS IMP SPEC: NORMAL
PATH REPORT.COMMENTS IMP SPEC: NORMAL
PATH REPORT.FINAL DX SPEC: NORMAL
PATH REPORT.GROSS SPEC: NORMAL
PATH REPORT.MICROSCOPIC SPEC OTHER STN: NORMAL
PATH REPORT.RELEVANT HX SPEC: NORMAL
PHOTO IMAGE: NORMAL

## 2021-07-29 ENCOUNTER — ALLIED HEALTH/NURSE VISIT (OUTPATIENT)
Dept: NURSING | Facility: CLINIC | Age: 46
End: 2021-07-29
Payer: COMMERCIAL

## 2021-07-29 VITALS — SYSTOLIC BLOOD PRESSURE: 120 MMHG | DIASTOLIC BLOOD PRESSURE: 84 MMHG

## 2021-07-29 DIAGNOSIS — Z30.9 CONTRACEPTIVE MANAGEMENT: Primary | ICD-10-CM

## 2021-07-29 PROCEDURE — 96372 THER/PROPH/DIAG INJ SC/IM: CPT | Performed by: FAMILY MEDICINE

## 2021-07-29 RX ADMIN — MEDROXYPROGESTERONE ACETATE 150 MG: 150 INJECTION, SUSPENSION INTRAMUSCULAR at 14:43

## 2021-08-20 ENCOUNTER — PATIENT OUTREACH (OUTPATIENT)
Dept: OBGYN | Facility: CLINIC | Age: 46
End: 2021-08-20

## 2021-08-20 DIAGNOSIS — R87.613 HSIL (HIGH GRADE SQUAMOUS INTRAEPITHELIAL LESION) ON PAP SMEAR OF CERVIX: ICD-10-CM

## 2021-08-20 NOTE — TELEPHONE ENCOUNTER
7/15/21 LEEP - Endocervix Bx: RYLAN 2/3, margin positive. Plan repeat pap and [place] in 6 months

## 2021-12-14 ENCOUNTER — PATIENT OUTREACH (OUTPATIENT)
Dept: OBGYN | Facility: CLINIC | Age: 46
End: 2021-12-14

## 2021-12-14 NOTE — LETTER
December 14, 2021      Celine Judd  [address]  [place] [address]        Dear ,    At [facility], your health and wellness are our primary concern. That is why we are following up on your most recent LEEP.    Please call [phone] to schedule an appointment for your recommended follow-up Pap smear and Human Papillomavirus (HPV) test and Colposcopy ([facility]) at your earliest convenience.     If you have completed the appointment outside of the [facility] system, please have the records forwarded to our office. We will update your chart for your provider to review before your next annual wellness visit.     Thank you for choosing [facility]!      Sincerely,    Your [facility] Care Team

## 2022-07-22 NOTE — MR AVS SNAPSHOT
Subjective:      Ms. Mohan is a 50 y.o. female who presents today for follow up for loss of smell and taste. She is scheduled today for nasal endoscopy.     HPI:  Kylah Mohan is a 50 y.o. female who was referred to me by Dr. Cornelio Minor in consultation for loss of smell and taste.    Ms. Mohan reports having loss of smell and taste that she first noticed over 2 years ago after having surgery for brain aneurysm on 11/4/2020. She has tried neti-pot, smell training, and fluticasone without benefit. She takes a multivitamin daily. She does not have a history of COVID-19 infection. She reports having associated maxillary sinus pressure and pain. She has nasal congestion at night and runny nose during the day.      Current sinonasal medications as above.  She does not regularly use nasal decongestant sprays.    She recalls previously having allergy testinglatex and dust mites    She denies a history of asthma.    She denies a history of reflux symptoms.  She has not previously had an EGD.    She denies have a diagnosis of obstructive sleep apnea.     She has not had sinonasal surgery.    She does not recall a prior history of nasal trauma.      Past Medical History  She has a past medical history of Benign hypertension, Bladder instability, Brain aneurysm, Depression, DJD (degenerative joint disease), lumbar, History of viral meningitis, Hyperlipidemia, Insomnia, Microscopic hematuria, Pulmonary nodule, and Tendonitis.    Past Surgical History  She has a past surgical history that includes Breast biopsy (6/5/2007); Tubal ligation (2002); Endometrial ablation (5/28/2009); Laparoscopic hysterectomy (2/2013); Anterior vaginal repair; Hysterectomy; Cerebral angiogram (N/A, 9/17/2020); Clip ligation of intracranial aneurysm by craniotomy (Left, 11/4/2020); Brain surgery; and Colonoscopy (N/A, 7/23/2021).    Family History  Her family history includes Allergies in her mother and sister; Breast cancer (age of onset: 56) in her  "              After Visit Summary   11/27/2017    Celine Judd    MRN: [id number]           Patient Information     Date Of Birth          1975        Visit Information        Provider Department      11/27/2017 1:00 PM BX NURSE [facility]        Today's Diagnoses     Contraception    -  1       Follow-ups after your visit        Your next 10 appointments already scheduled     Nov 27, 2017  1:00 PM CST   Nurse Only with BX NURSE   [facility] ([facility])    [address]  [facility] [address] [phone]            Dec 06, 2017  1:30 PM CST   PHYSICAL with Radha Baptiste MD   [facility]    [address]  [facility] [address] [phone]              Who to contact     If you have questions or need follow up information about today's clinic visit or your schedule please contact [facility] directly at [phone].  Normal or non-critical lab and imaging results will be communicated to you by [employer], letter or phone within 4 business days after the clinic has received the results. If you do not hear from us within 7 days, please contact the clinic through [employer] or phone. If you have a critical or abnormal lab result, we will notify you by phone as soon as possible.  Submit refill requests through [employer] or call your pharmacy and they will forward the refill request to us. Please allow 3 business days for your refill to be completed.          Additional Information About Your Visit        MyChart Information     [employer] lets you send messages to your doctor, view your test results, renew your prescriptions, schedule appointments and more. To sign up, go to [email or web address] . Click on \"Log in\" on the left side of the screen, which will take you to the Welcome page. Then click on \"Sign up " "Now\" on the right side of the page.     You will be asked to enter the access code listed below, as well as some personal information. Please follow the directions to create your username and password.     Your access code is: [email or web address]  Expires: 12/10/2017  8:41 AM     Your access code will  in 90 days. If you need help or a new code, please call your [place] clinic or [phone].        Care EveryWhere ID     This is your Care EveryWhere ID. This could be used by other organizations to access your [place] medical records  [id number]        Your Vitals Were     Last Period                   (LMP Unknown)            Blood Pressure from Last 3 Encounters:   17 136/82   17 (!) 160/110   17 124/68    Weight from Last 3 Encounters:   17 117 lb (53.1 kg)   17 112 lb (50.8 kg)   17 114 lb (51.7 kg)              We Performed the Following     Medroxyprogesterone inj/1mg (Depo Provera J-Code)     THER/PROPH/DIAG INJ, SC/IM        Primary Care Provider Office Phone # Fax #    Latonia Nguyen [phone]       [address] [facility] [address]        Equal Access to Services     SEA GOLD : Hadii aad ku hadasho Soomaali, waaxda luqadaha, qaybta kaalmada adenatalieyada, luis gloria. So [facility] [phone].    ATENCIÓN: Si habla español, tiene a iglesias disposición servicios gratuitos de asistencia lingüística. Llame al [phone].    We comply with applicable federal civil rights laws and [place] laws. We do not discriminate on the basis of race, color, national origin, age, disability, sex, sexual orientation, or gender identity.            Thank you!     [facility]. Our goal is always to provide you with excellent care. Hearing back from our patients is one way we can continue to improve our services. Please take a few minutes to complete the written " paternal grandmother; Breast cancer (age of onset: 61) in her maternal aunt; Cancer in her paternal grandmother; Diabetes in her father and paternal grandmother; Heart disease in her sister; Hyperlipidemia in her father and mother; Hypertension in her father and mother; Osteoarthritis in her mother; Ovarian cancer (age of onset: 50) in her maternal cousin.    Social History  She reports that she quit smoking about 8 years ago. Her smoking use included cigarettes. She has a 3.75 pack-year smoking history. She has never used smokeless tobacco. She reports previous alcohol use of about 1.0 standard drink of alcohol per week. She reports that she does not use drugs.    Allergies  She is allergic to latex.    Medications   She has a current medication list which includes the following prescription(s): ascorbic acid (vitamin c), aspirin, atenolol, cetirizine, escitalopram oxalate, fluticasone propionate, hydrochlorothiazide, lactobacillus rhamnosus gg, losartan, multivitamin, fish oil-omega-3 fatty acids, tazarotene, urea, and lorazepam.      Review of Systems     Constitutional: Positive for appetite change and fatigue.  Negative for chills and fever.      HENT: Positive for ear pain, facial swelling, nosebleeds, postnasal drip, sinus infection, sinus pressure, trouble swallowing and voice change.  Negative for hearing loss, ringing in the ears, runny nose, sore throat and stuffy nose.      Eyes:  Positive for eye itching and photophobia. Negative for change in eyesight.     Respiratory:  Positive for shortness of breath. Negative for cough.      Cardiovascular:  Positive for chest pain and irregular heartbeat. Negative for foot swelling.     Gastrointestinal:  Positive for constipation. Negative for acid reflux, heartburn and vomiting.     Genitourinary: Negative for difficulty urinating, sexual problems and frequent urination.     Musc: Positive for aching muscles, back pain and neck pain. Negative for aching joints.      Skin: Negative for rash.     Allergy: Positive for seasonal allergies. Negative for food allergies.     Endocrine: Positive for cold intolerance. Negative for heat intolerance.      Neurological: Positive for dizziness, headaches and light-headedness.     Hematologic: Positive for bruises/bleeds easily. Negative for swollen glands.      Psychiatric: Positive for decreased concentration, depression, nervous/anxious and sleep disturbance.         Objective:     There were no vitals taken for this visit.       Constitutional:   She is oriented to person, place, and time. Vital signs are normal. She appears well-developed and well-nourished. She appears alert. Normal speech.      Head:  Normocephalic and atraumatic.     Ears:    Right Ear: No lacerations. No drainage, swelling or tenderness. No foreign bodies. No mastoid tenderness. Tympanic membrane is not injected, not scarred, not perforated, not erythematous, not retracted and not bulging. Tympanic membrane mobility is normal. No middle ear effusion. No hemotympanum.   Left Ear: No lacerations. No drainage, swelling or tenderness. No foreign bodies. No mastoid tenderness. Tympanic membrane is not injected, not scarred, not perforated, not erythematous, not retracted and not bulging. Tympanic membrane mobility is normal.  No middle ear effusion. No hemotympanum.     Nose:  No mucosal edema, rhinorrhea, nose lacerations, sinus tenderness, septal deviation, nasal septal hematoma or polyps. No epistaxis.  No foreign bodies. Turbinate hypertrophy.  Right sinus exhibits maxillary sinus tenderness. Right sinus exhibits no frontal sinus tenderness. Left sinus exhibits maxillary sinus tenderness. Left sinus exhibits no frontal sinus tenderness.     Mouth/Throat  Oropharynx clear and moist without lesions or asymmetry, normal uvula midline and lips, teeth, and gums normal. No uvula swelling, oral lesions, trismus, mucous membrane lesions or xerostomia. No oropharyngeal  survey that you may receive in the mail after your visit with us. Thank you!             Your Updated Medication List - Protect others around you: Learn how to safely use, store and throw away your medicines at www.disposemymeds.org.          This list is accurate as of: 11/27/17 12:38 PM.  Always use your most recent med list.                   Brand Name Dispense Instructions for use Diagnosis    acetaminophen 500 MG tablet    TYLENOL    100 tablet    Take 2 tablets po q 6 hr prn pain    Acute pain of left knee       amLODIPine 10 MG tablet    NORVASC    90 tablet    Take 1 tablet (10 mg) by mouth daily    Benign essential hypertension, Hypokalemia       BENADRYL PO           cholecalciferol 5000 UNITS Caps capsule    vitamin D3    90 capsule    Take 1 capsule (5,000 Units) by mouth daily    Vitamin D deficiency disease       loratadine 10 MG tablet    CLARITIN    90 tablet    Take 1 tablet (10 mg) by mouth daily    Benign essential hypertension, Hypokalemia       medroxyPROGESTERone 150 MG/ML injection    DEPO-PROVERA    1 mL    Inject 1 mL (150 mg) into the muscle every 3 months    Contraception       metoprolol 100 MG tablet    LOPRESSOR    90 tablet    Take 1 tablet (100 mg) by mouth 2 times daily    Benign essential hypertension       naproxen 500 MG tablet    NAPROSYN    60 tablet    Take 1 tablet (500 mg) by mouth 2 times daily (with meals)    Pain in joint involving ankle and foot, left       potassium chloride SA 10 MEQ CR tablet    K-DUR/KLOR-CON M    20 tablet    Take 1 tablet (10 mEq) by mouth 2 times daily    Hypokalemia       ranitidine 150 MG tablet    ZANTAC    180 tablet    Take 1 tablet (150 mg) by mouth 2 times daily    Gastroesophageal reflux disease without esophagitis          exudate, posterior oropharyngeal edema or posterior oropharyngeal erythema.     Neck:  Neck normal without thyromegaly masses, asymmetry, normal tracheal structure, crepitus, and tenderness and no adenopathy.     Psychiatric:   She has a normal mood and affect. Her speech is normal and behavior is normal.     Neurological:   She is alert and oriented to person, place, and time. No cranial nerve deficit.     Skin:   No abrasions, lacerations, lesions, or rashes.       Procedure    None      Data Reviewed    WBC (K/uL)   Date Value   12/06/2021 7.34     Eosinophil % (%)   Date Value   12/06/2021 2.0     Eos # (K/uL)   Date Value   12/06/2021 0.2     Platelets (K/uL)   Date Value   12/06/2021 343     Glucose (mg/dL)   Date Value   01/20/2022 96     No results found for: IGE    Vit. B1, B12, and folate levels are WNL.     CTA Head (7/21/22):  FINDINGS:  There has been prior left pterional craniotomy and aneurysm clipping in the region the anterior communicating artery.     No overt hydrocephalus mass effect intracranial hemorrhage or acute intracranial process is identified.  No enhancing intracranial lesion.     The visualized sinuses and mastoid air cells are clear other than from minimal maxillary mucosal thickening.     ACT angiographic evaluation:     Minimal filling of the aneurysm neck is questioned however this is stable from studies dating back to 2020.  No evidence of recurrent aneurysm in the anterior communicating artery region with a stable appearance.  The adjacent branches remain patent.     No new major branch stenosis/occlusion is identified at the [id number].  No additional aneurysm is appreciated.  Developmentally prominent posterior communicating artery on the right is noted.     Impression:     No acute intracranial process.  Postsurgical changes.     Stable appearance of the anterior communicating artery clipping without recurrent aneurysm.  Minimal filling of the aneurysm neck may be present  however this is stable from prior studies.       Assessment:     1. Anosmia    2. Hypertrophy of inferior nasal turbinate         Plan:     1) Continue Olfactory training (instruction sheet provided)  2) The Smell Identification test score: 10 (anosmia)  3) Fluticasone (Flonase) - 2 sprays each nostril daily  4) Continue multivitamin daily.  5) Ordered labs (B12, B1, folate) - WNL

## 2022-08-01 ENCOUNTER — OFFICE VISIT (OUTPATIENT)
Dept: FAMILY MEDICINE | Facility: CLINIC | Age: 47
End: 2022-08-01
Payer: MEDICAID

## 2022-08-01 VITALS
BODY MASS INDEX: 17.66 KG/M2 | HEIGHT: 65 IN | RESPIRATION RATE: 18 BRPM | OXYGEN SATURATION: 98 % | SYSTOLIC BLOOD PRESSURE: 134 MMHG | DIASTOLIC BLOOD PRESSURE: 82 MMHG | HEART RATE: 86 BPM | WEIGHT: 106 LBS | TEMPERATURE: 98.4 F

## 2022-08-01 DIAGNOSIS — Z00.00 ENCOUNTER FOR ROUTINE ADULT HEALTH EXAMINATION WITHOUT ABNORMAL FINDINGS: Primary | ICD-10-CM

## 2022-08-01 DIAGNOSIS — I10 BENIGN ESSENTIAL HYPERTENSION: ICD-10-CM

## 2022-08-01 DIAGNOSIS — Z12.31 ENCOUNTER FOR SCREENING MAMMOGRAM FOR BREAST CANCER: ICD-10-CM

## 2022-08-01 DIAGNOSIS — Z23 NEED FOR VACCINATION AGAINST STREPTOCOCCUS PNEUMONIAE: ICD-10-CM

## 2022-08-01 DIAGNOSIS — Z12.4 CERVICAL CANCER SCREENING: ICD-10-CM

## 2022-08-01 DIAGNOSIS — Z12.11 SCREEN FOR COLON CANCER: ICD-10-CM

## 2022-08-01 DIAGNOSIS — Z13.220 SCREENING FOR HYPERLIPIDEMIA: ICD-10-CM

## 2022-08-01 PROCEDURE — 99213 OFFICE O/P EST LOW 20 MIN: CPT | Mod: 25 | Performed by: PHYSICIAN ASSISTANT

## 2022-08-01 PROCEDURE — 90677 PCV20 VACCINE IM: CPT | Performed by: PHYSICIAN ASSISTANT

## 2022-08-01 PROCEDURE — 80048 BASIC METABOLIC PNL TOTAL CA: CPT | Performed by: PHYSICIAN ASSISTANT

## 2022-08-01 PROCEDURE — 87624 HPV HI-RISK TYP POOLED RSLT: CPT | Performed by: PHYSICIAN ASSISTANT

## 2022-08-01 PROCEDURE — 99396 PREV VISIT EST AGE 40-64: CPT | Mod: 25 | Performed by: PHYSICIAN ASSISTANT

## 2022-08-01 PROCEDURE — 90471 IMMUNIZATION ADMIN: CPT | Performed by: PHYSICIAN ASSISTANT

## 2022-08-01 PROCEDURE — 80061 LIPID PANEL: CPT | Performed by: PHYSICIAN ASSISTANT

## 2022-08-01 PROCEDURE — 88175 CYTOPATH C/V AUTO FLUID REDO: CPT | Performed by: PHYSICIAN ASSISTANT

## 2022-08-01 PROCEDURE — 36415 COLL VENOUS BLD VENIPUNCTURE: CPT | Performed by: PHYSICIAN ASSISTANT

## 2022-08-01 RX ORDER — METOPROLOL TARTRATE 100 MG
100 TABLET ORAL 2 TIMES DAILY
Qty: 180 TABLET | Refills: 3 | Status: SHIPPED | OUTPATIENT
Start: 2022-08-01

## 2022-08-01 RX ORDER — AMLODIPINE BESYLATE 10 MG/1
10 TABLET ORAL DAILY
Qty: 90 TABLET | Refills: 3 | Status: SHIPPED | OUTPATIENT
Start: 2022-08-01

## 2022-08-01 ASSESSMENT — ENCOUNTER SYMPTOMS
MYALGIAS: 0
NAUSEA: 0
PALPITATIONS: 0
HEARTBURN: 1
HEMATURIA: 0
WEAKNESS: 0
FREQUENCY: 0
CHILLS: 0
PARESTHESIAS: 0
DYSURIA: 0
CONSTIPATION: 0
SHORTNESS OF BREATH: 0
ARTHRALGIAS: 0
SORE THROAT: 0
COUGH: 0
JOINT SWELLING: 0
EYE PAIN: 0
DIZZINESS: 0
ABDOMINAL PAIN: 0
FEVER: 0
HEADACHES: 0
NERVOUS/ANXIOUS: 0
BREAST MASS: 0
DIARRHEA: 0
HEMATOCHEZIA: 0

## 2022-08-01 ASSESSMENT — PATIENT HEALTH QUESTIONNAIRE - PHQ9
10. IF YOU CHECKED OFF ANY PROBLEMS, HOW DIFFICULT HAVE THESE PROBLEMS MADE IT FOR YOU TO DO YOUR WORK, TAKE CARE OF THINGS AT HOME, OR GET ALONG WITH OTHER PEOPLE: SOMEWHAT DIFFICULT
SUM OF ALL RESPONSES TO PHQ QUESTIONS 1-9: 2
SUM OF ALL RESPONSES TO PHQ QUESTIONS 1-9: 2

## 2022-08-01 ASSESSMENT — PAIN SCALES - GENERAL: PAINLEVEL: NO PAIN (0)

## 2022-08-01 NOTE — PROGRESS NOTES
SUBJECTIVE:   CC: Celine Judd is an 47 year old woman who presents for preventive health visit.     {Patient has been advised of split billing requirements and indicates understanding: Yes  Healthy Habits:     Getting at least 3 servings of Calcium per day:  NO    Bi-annual eye exam:  Yes    Dental care twice a year:  NO    Sleep apnea or symptoms of sleep apnea:  None    Diet:  Regular (no restrictions)    Frequency of exercise:  1 day/week    Duration of exercise:  Less than 15 minutes    Taking medications regularly:  Yes    Medication side effects:  Other    PHQ-2 Total Score: 0    Additional concerns today:  No      Today's PHQ-2 Score:   PHQ-2 ( 1999 [employer]) 8/1/2022   Q1: Little interest or pleasure in doing things 0   Q2: Feeling down, depressed or hopeless 0   PHQ-2 Score 0   PHQ-2 Total Score (12-17 Years)- Positive if 3 or more points; Administer PHQ-A if positive -   Q1: Little interest or pleasure in doing things Not at all   Q2: Feeling down, depressed or hopeless Not at all   PHQ-2 Score 0       PHQ 5/24/2019 2/23/2021 8/1/2022   PHQ-9 Total Score 1 1 2   Q9: Thoughts of better off dead/self-harm past 2 weeks Not at all Not at all Not at all     NURA-7 SCORE 9/6/2018 10/25/2018 5/24/2019   Total Score 9 3 2           Abuse: Current or Past (Physical, Sexual or Emotional) - No  Do you feel safe in your environment? Yes        Social History     Tobacco Use     Smoking status: Current Every Day Smoker     Packs/day: 0.25     Years: 10.00     Pack years: 2.50     Types: Cigarettes     Smokeless tobacco: Current User   Substance Use Topics     Alcohol use: Yes     Alcohol/week: 0.0 standard drinks     Comment: 2d 3x/wk max         Alcohol Use 8/1/2022   Prescreen: >3 drinks/day or >7 drinks/week? No   Prescreen: >3 drinks/day or >7 drinks/week? -     Reviewed orders with patient.  Reviewed health maintenance and updated orders accordingly - Yes  Patient Active Problem List   Diagnosis     Allergic  rhinitis     HSIL (high grade squamous intraepithelial lesion) on Pap smear of cervix     Benign essential hypertension     Hypertriglyceridemia     Major depression in complete remission (H) on meds since 4-24-15; onset 2011     Glucose intolerance (impaired glucose tolerance)     Family history of diabetes mellitus     Protein-calorie malnutrition (H)     Gastroesophageal reflux disease without esophagitis     Impaired fasting glucose     Moderate episode of recurrent major depressive disorder (H) w mom's Dx ca     Mild protein-calorie malnutrition (H)     Sinus tachycardia-resolved on increased B blocker     Chronic allergic rhinitis, unspecified seasonality, unspecified trigger     Hypokalemia by hx -=on supplements      Sebaceous cyst-infected--I&D done 9-6-18a     Tobacco use disorder: [age]y/o (3-13) @ 1/4 ppd=3-4 pk yr hx and continues at 1/5 ppd since      Contraception-depo f/u      Depo-Provera contraceptive status     Cellulitis and abscess of leg Rt calf from spider bite 5-22-19     Past Surgical History:   Procedure Laterality Date     NO HISTORY OF SURGERY         Social History     Tobacco Use     Smoking status: Current Every Day Smoker     Packs/day: 0.25     Years: 10.00     Pack years: 2.50     Types: Cigarettes     Smokeless tobacco: Current User   Substance Use Topics     Alcohol use: Yes     Alcohol/week: 0.0 standard drinks     Comment: 2d 3x/wk max     Family History   Problem Relation Age of Onset     Glaucoma Mother 61     Hypertension Mother 55     Breast Cancer Mother      Transient ischemic attack Mother         brain anuerysm     Cancer Father 47        lung      No Known Problems Brother      No Known Problems Son      No Known Problems Brother      No Known Problems Brother      No Known Problems Son      Diabetes Maternal Grandmother 65     No Known Problems Maternal Grandfather      No Known Problems Paternal Grandmother      No Known Problems Paternal Grandfather      No Known  Problems Sister      No Known Problems Daughter      No Known Problems Maternal Half-Brother      No Known Problems Maternal Half-Sister      No Known Problems Paternal Half-Brother      No Known Problems Paternal Half-Sister      No Known Problems Niece      No Known Problems Nephew      No Known Problems Cousin      No Known Problems Other      Coronary Artery Disease No family hx of      Cancer - colorectal No family hx of      Ovarian Cancer No family hx of      Prostate Cancer No family hx of      Depression/Anxiety No family hx of      Thyroid Disease No family hx of      Chemical Addiction No family hx of      Hyperlipidemia No family hx of      Cerebrovascular Disease No family hx of      Colon Cancer No family hx of      Other Cancer No family hx of      Depression No family hx of      Anxiety Disorder No family hx of      Mental Illness No family hx of      Substance Abuse No family hx of      Anesthesia Reaction No family hx of      Asthma No family hx of      Osteoporosis No family hx of      Genetic Disorder No family hx of      Obesity No family hx of      Unknown/Adopted No family hx of          Current Outpatient Medications   Medication Sig Dispense Refill     amLODIPine (NORVASC) 10 MG tablet Take 1 tablet (10 mg) by mouth daily 90 tablet 3     cholecalciferol (VITAMIN D3) 5000 UNITS CAPS capsule Take 1 capsule (5,000 Units) by mouth daily 90 capsule 0     DiphenhydrAMINE HCl (BENADRYL PO)        metoprolol tartrate (LOPRESSOR) 100 MG tablet Take 1 tablet (100 mg) by mouth 2 times daily 180 tablet 3     Allergies   Allergen Reactions     Lisinopril Swelling     Lip Angioedema       Breast Cancer Screening:    FHS-7:   Breast CA Risk Assessment (FHS-7) 8/1/2022   Did any of your first-degree relatives have breast or ovarian cancer? Yes   Did any of your relatives have bilateral breast cancer? Unknown         Pertinent mammograms are reviewed under the imaging tab.    History of abnormal Pap smear:  "YES - updated in Problem List and Health Maintenance accordingly  PAP / HPV Latest Ref Rng & Units 2/23/2021 3/16/2018 10/27/2016   PAP (Historical) - HSIL(A) NIL ASC-H(A)   HPV16 NEG:Negative Negative Negative Negative   HPV18 NEG:Negative Negative Negative Negative   HRHPV NEG:Negative Positive(A) Positive(A) Positive(A)     Reviewed and updated as needed this visit by clinical staff   Tobacco  Allergies  Meds                Reviewed and updated as needed this visit by Provider   Tobacco                     Review of Systems   Constitutional: Negative for chills and fever.   HENT: Negative for congestion, ear pain, hearing loss and sore throat.    Eyes: Negative for pain and visual disturbance.   Respiratory: Negative for cough and shortness of breath.    Cardiovascular: Negative for chest pain, palpitations and peripheral edema.   Gastrointestinal: Positive for heartburn. Negative for abdominal pain, constipation, diarrhea, hematochezia and nausea.   Breasts:  Negative for tenderness, breast mass and discharge.   Genitourinary: Negative for dysuria, frequency, genital sores, hematuria, pelvic pain, urgency, vaginal bleeding and vaginal discharge.   Musculoskeletal: Negative for arthralgias, joint swelling and myalgias.   Skin: Negative for rash.   Neurological: Negative for dizziness, weakness, headaches and paresthesias.   Psychiatric/Behavioral: Negative for mood changes. The patient is not nervous/anxious.           OBJECTIVE:   /82   Pulse 86   Temp 98.4  F (36.9  C)   Resp 18   Ht 1.65 m (5' 4.96\")   Wt 48.1 kg (106 lb)   SpO2 98%   BMI 17.66 kg/m    Physical Exam  Constitutional:       General: She is not in acute distress.     Appearance: She is well-developed.   HENT:      Right Ear: Tympanic membrane and external ear normal.      Left Ear: Tympanic membrane and external ear normal.      Nose: Nose normal.      Mouth/Throat:      Pharynx: No oropharyngeal exudate.   Eyes:      General:    "      Right eye: No discharge.         Left eye: No discharge.      Conjunctiva/sclera: Conjunctivae normal.      Pupils: Pupils are equal, round, and reactive to light.   Neck:      Thyroid: No thyromegaly.      Trachea: No tracheal deviation.   Cardiovascular:      Rate and Rhythm: Normal rate and regular rhythm.      Pulses: Normal pulses.      Heart sounds: Normal heart sounds, S1 normal and S2 normal. No murmur heard.    No friction rub. No S3 or S4 sounds.   Pulmonary:      Effort: Pulmonary effort is normal. No respiratory distress.      Breath sounds: Normal breath sounds. No wheezing or rales.   Chest:   Breasts:      Right: No mass, nipple discharge or tenderness.      Left: No mass, nipple discharge or tenderness.       Abdominal:      Palpations: Abdomen is soft. There is no mass.      Tenderness: There is no abdominal tenderness.   Genitourinary:     Vagina: Normal.      Cervix: No cervical motion tenderness or discharge.   Musculoskeletal:         General: Normal range of motion.      Cervical back: Neck supple.   Lymphadenopathy:      Cervical: No cervical adenopathy.   Skin:     General: Skin is warm and dry.      Findings: Lesion (right breast, hyperpigmentation from previous abscess/bite) present. No rash.   Neurological:      Mental Status: She is alert and oriented to person, place, and time.      Motor: No abnormal muscle tone.   Psychiatric:         Thought Content: Thought content normal.         Judgment: Judgment normal.           Diagnostic Test Results:  Labs reviewed in Epic    ASSESSMENT/PLAN:   Celine was seen today for physical.    Diagnoses and all orders for this visit:    Encounter for routine adult health examination without abnormal findings  -     Lipid panel reflex to direct LDL Non-fasting; Future  -     Basic metabolic panel  (Ca, Cl, CO2, Creat, Gluc, K, Na, BUN); Future  -     Lipid panel reflex to direct LDL Non-fasting  -     Basic metabolic panel  (Ca, Cl, CO2, Creat,  "Gluc, K, Na, BUN)    Screen for colon cancer  -     Fecal colorectal cancer screen (FIT); Future  -     Fecal colorectal cancer screen (FIT)    Cervical cancer screening  -     Pap Diagnostic with HPV    Screening for hyperlipidemia  -     Lipid panel reflex to direct LDL Non-fasting; Future  -     Lipid panel reflex to direct LDL Non-fasting    Encounter for screening mammogram for breast cancer  -     MA Screen Bilateral w/Sudeep; Future    Need for vaccination against Streptococcus pneumoniae  -     PNEUMOCOCCAL 20 VALENT CONJUGATE (PREVNAR 20)    Benign essential hypertension  -     amLODIPine (NORVASC) 10 MG tablet; Take 1 tablet (10 mg) by mouth daily  -     metoprolol tartrate (LOPRESSOR) 100 MG tablet; Take 1 tablet (100 mg) by mouth 2 times daily    Other orders  -     REVIEW OF HEALTH MAINTENANCE PROTOCOL ORDERS      Preventive care updated.   Patient had colp and LEEP in 2021.  Diagnostic Pap collected today.   Refill amlodipine and metoprolol.     Patient is moving to [place] in 2 weeks - getting preventive care updated prior to moving        COUNSELING:  Reviewed preventive health counseling, as reflected in patient instructions    Estimated body mass index is 17.66 kg/m  as calculated from the following:    Height as of this encounter: 1.65 m (5' 4.96\").    Weight as of this encounter: 48.1 kg (106 lb).        She reports that she has been smoking cigarettes. She has a 2.50 pack-year smoking history. She uses smokeless tobacco.  Nicotine/Tobacco Cessation Plan:   Information offered: Patient not interested at this time      Counseling Resources:  ATP IV Guidelines  Pooled Cohorts Equation Calculator  Breast Cancer Risk Calculator  BRCA-Related Cancer Risk Assessment: FHS-7 Tool  FRAX Risk Assessment  ICSI Preventive Guidelines  Dietary Guidelines for Americans, 2010  USDA's MyPlate  ASA Prophylaxis  Lung CA Screening    CINDY Baird [facility]  "

## 2022-08-01 NOTE — LETTER
August 4, 2022      Celine Judd  [address]  [place] [address]        Dear ,    We are writing to inform you of your test results.    Your test results fall within the expected range(s) or remain unchanged from previous results.  Please continue with current treatment plan.  Triglycerides are high - they have been in the past as well.  Try to cut back on sweets, breads, sugars, and fatty foods.     You Pap results will come in a separate letter from the Pap nurse team.     Resulted Orders   Lipid panel reflex to direct LDL Non-fasting   Result Value Ref Range    Cholesterol 183 <200 mg/dL    Triglycerides 392 (H) <150 mg/dL    Direct Measure HDL 57 >=50 mg/dL    LDL Cholesterol Calculated 48 <=100 mg/dL    Non HDL Cholesterol 126 <130 mg/dL    Patient Fasting > 8hrs? Yes     Narrative    Cholesterol  Desirable:  <200 mg/dL    Triglycerides  Normal:  Less than 150 mg/dL  Borderline High:  150-199 mg/dL  High:  200-499 mg/dL  Very High:  Greater than or equal to 500 mg/dL    Direct Measure HDL  Female:  Greater than or equal to 50 mg/dL   Male:  Greater than or equal to 40 mg/dL    LDL Cholesterol  Desirable:  <100mg/dL  Above Desirable:  100-129 mg/dL   Borderline High:  130-159 mg/dL   High:  160-189 mg/dL   Very High:  >= 190 mg/dL    Non HDL Cholesterol  Desirable:  130 mg/dL  Above Desirable:  130-159 mg/dL  Borderline High:  160-189 mg/dL  High:  190-219 mg/dL  Very High:  Greater than or equal to 220 mg/dL   Basic metabolic panel  (Ca, Cl, CO2, Creat, Gluc, K, Na, BUN)   Result Value Ref Range    Sodium 136 133 - 144 mmol/L    Potassium 3.4 3.4 - 5.3 mmol/L    Chloride 102 94 - 109 mmol/L    Carbon Dioxide (CO2) 25 20 - 32 mmol/L    Anion Gap 9 3 - 14 mmol/L    Urea Nitrogen 9 7 - 30 mg/dL    Creatinine 0.64 0.52 - 1.04 mg/dL    Calcium 9.4 8.5 - 10.1 mg/dL    Glucose 103 (H) 70 - 99 mg/dL    GFR Estimate >90 >60 mL/min/1.73m2      Comment:      Effective December 21, 2021 eGFRcr in  adults is calculated using the 2021 CKD-EPI creatinine equation which includes age and gender (Rich et al., NEJM, DOI: 10.1056/FMKNli8701104)       If you have any questions or concerns, please call the clinic at the number listed above.       Sincerely,      Jerilyn Mobley PA-C

## 2022-08-02 LAB
ANION GAP SERPL CALCULATED.3IONS-SCNC: 9 MMOL/L (ref 3–14)
BUN SERPL-MCNC: 9 MG/DL (ref 7–30)
CALCIUM SERPL-MCNC: 9.4 MG/DL (ref 8.5–10.1)
CHLORIDE BLD-SCNC: 102 MMOL/L (ref 94–109)
CHOLEST SERPL-MCNC: 183 MG/DL
CO2 SERPL-SCNC: 25 MMOL/L (ref 20–32)
CREAT SERPL-MCNC: 0.64 MG/DL (ref 0.52–1.04)
FASTING STATUS PATIENT QL REPORTED: YES
GFR SERPL CREATININE-BSD FRML MDRD: >90 ML/MIN/1.73M2
GLUCOSE BLD-MCNC: 103 MG/DL (ref 70–99)
HDLC SERPL-MCNC: 57 MG/DL
LDLC SERPL CALC-MCNC: 48 MG/DL
NONHDLC SERPL-MCNC: 126 MG/DL
POTASSIUM BLD-SCNC: 3.4 MMOL/L (ref 3.4–5.3)
SODIUM SERPL-SCNC: 136 MMOL/L (ref 133–144)
TRIGL SERPL-MCNC: 392 MG/DL

## 2022-08-03 LAB
BKR LAB AP GYN ADEQUACY: NORMAL
BKR LAB AP GYN INTERPRETATION: NORMAL
BKR LAB AP HPV REFLEX: NORMAL
BKR LAB AP PREVIOUS ABNL DX: NORMAL
BKR LAB AP PREVIOUS ABNORMAL: NORMAL
PATH REPORT.COMMENTS IMP SPEC: NORMAL
PATH REPORT.COMMENTS IMP SPEC: NORMAL
PATH REPORT.RELEVANT HX SPEC: NORMAL

## 2022-08-08 ENCOUNTER — PATIENT OUTREACH (OUTPATIENT)
Dept: FAMILY MEDICINE | Facility: CLINIC | Age: 47
End: 2022-08-08

## 2022-08-08 ENCOUNTER — ANCILLARY PROCEDURE (OUTPATIENT)
Dept: MAMMOGRAPHY | Facility: CLINIC | Age: 47
End: 2022-08-08
Attending: PHYSICIAN ASSISTANT
Payer: MEDICAID

## 2022-08-08 DIAGNOSIS — Z12.31 VISIT FOR SCREENING MAMMOGRAM: ICD-10-CM

## 2022-08-08 DIAGNOSIS — D06.9 CIN III (CERVICAL INTRAEPITHELIAL NEOPLASIA GRADE III) WITH SEVERE DYSPLASIA: ICD-10-CM

## 2022-08-08 DIAGNOSIS — Z12.31 ENCOUNTER FOR SCREENING MAMMOGRAM FOR BREAST CANCER: ICD-10-CM

## 2022-08-08 LAB
HUMAN PAPILLOMA VIRUS 16 DNA: NEGATIVE
HUMAN PAPILLOMA VIRUS 18 DNA: NEGATIVE
HUMAN PAPILLOMA VIRUS FINAL DIAGNOSIS: ABNORMAL
HUMAN PAPILLOMA VIRUS OTHER HR: POSITIVE

## 2022-08-08 PROCEDURE — 77063 BREAST TOMOSYNTHESIS BI: CPT | Mod: TC | Performed by: RADIOLOGY

## 2022-08-08 PROCEDURE — 77067 SCR MAMMO BI INCL CAD: CPT | Mod: TC | Performed by: RADIOLOGY

## 2022-08-08 NOTE — LETTER
September 30, 2022      Celine Judd  [address]  [facility] [address]        Dear ,    At [facility], your health and wellness are our primary concern. That is why we are following up on your most recent abnormal Pap smear and positive high-risk Human Papillomavirus (HPV) test.    Please call [phone] to schedule an appointment for your recommended follow-up Colposcopy (this cannot be scheduled through [facility]) at your earliest convenience.      If you have completed the appointment outside of the [facility] system, please have the records forwarded to our office. We will update your chart for your provider to review before your next annual wellness visit.     Thank you for choosing [facility]!      Sincerely,    Your [facility] Care Team

## 2023-01-14 ENCOUNTER — HEALTH MAINTENANCE LETTER (OUTPATIENT)
Age: 48
End: 2023-01-14

## 2023-02-15 NOTE — TELEPHONE ENCOUNTER
Patient due for Colposcopy.    Reminder done today via telephone call.    PT MOVED TO [place]. Tracking discontinued

## 2023-09-24 ENCOUNTER — HEALTH MAINTENANCE LETTER (OUTPATIENT)
Age: 48
End: 2023-09-24

## 2023-12-03 ENCOUNTER — HEALTH MAINTENANCE LETTER (OUTPATIENT)
Age: 48
End: 2023-12-03

## 2024-01-02 NOTE — PROGRESS NOTES
Follow Up Injection    Patient returning during stated date range given at previous visit: Yes      If here at the correct interval:   BP Readings from Last 1 Encounters:   05/31/19 (!) 140/100     Wt Readings from Last 1 Encounters:   05/31/19 51.7 kg (114 lb)       Side effects or problems with last injection?  No.  Date range is given to patient for next dose: 05/15/2020-05/29/2020    See Medication Note for administration information    Staff Sig: Martina Archuleta CMA      
No

## 2024-11-17 ENCOUNTER — HEALTH MAINTENANCE LETTER (OUTPATIENT)
Age: 49
End: 2024-11-17